# Patient Record
Sex: FEMALE | Race: WHITE | Employment: UNEMPLOYED | URBAN - METROPOLITAN AREA
[De-identification: names, ages, dates, MRNs, and addresses within clinical notes are randomized per-mention and may not be internally consistent; named-entity substitution may affect disease eponyms.]

---

## 2018-12-15 ENCOUNTER — APPOINTMENT (OUTPATIENT)
Dept: GENERAL RADIOLOGY | Age: 38
End: 2018-12-15
Payer: COMMERCIAL

## 2018-12-15 ENCOUNTER — HOSPITAL ENCOUNTER (EMERGENCY)
Age: 38
Discharge: HOME | End: 2018-12-15
Attending: EMERGENCY MEDICINE
Payer: COMMERCIAL

## 2018-12-15 VITALS
RESPIRATION RATE: 19 BRPM | BODY MASS INDEX: 29.03 KG/M2 | HEIGHT: 67 IN | WEIGHT: 185 LBS | HEART RATE: 83 BPM | OXYGEN SATURATION: 100 % | TEMPERATURE: 97.9 F | DIASTOLIC BLOOD PRESSURE: 45 MMHG | SYSTOLIC BLOOD PRESSURE: 128 MMHG

## 2018-12-15 DIAGNOSIS — R00.2 PALPITATIONS: Primary | ICD-10-CM

## 2018-12-15 LAB
A/G RATIO: 1.3 (ref 1.1–2.2)
ALBUMIN SERPL-MCNC: 4.4 G/DL (ref 3.4–5)
ALP BLD-CCNC: 86 U/L (ref 40–129)
ALT SERPL-CCNC: 10 U/L (ref 10–40)
ANION GAP SERPL CALCULATED.3IONS-SCNC: 10 MMOL/L (ref 3–16)
AST SERPL-CCNC: 13 U/L (ref 15–37)
BASOPHILS ABSOLUTE: 0.1 K/UL (ref 0–0.2)
BASOPHILS RELATIVE PERCENT: 1.9 %
BILIRUB SERPL-MCNC: 0.5 MG/DL (ref 0–1)
BUN BLDV-MCNC: 10 MG/DL (ref 7–20)
CALCIUM SERPL-MCNC: 9.4 MG/DL (ref 8.3–10.6)
CHLORIDE BLD-SCNC: 106 MMOL/L (ref 99–110)
CO2: 27 MMOL/L (ref 21–32)
CREAT SERPL-MCNC: 0.7 MG/DL (ref 0.6–1.1)
EOSINOPHILS ABSOLUTE: 0.1 K/UL (ref 0–0.6)
EOSINOPHILS RELATIVE PERCENT: 1.3 %
GFR AFRICAN AMERICAN: >60
GFR NON-AFRICAN AMERICAN: >60
GLOBULIN: 3.3 G/DL
GLUCOSE BLD-MCNC: 111 MG/DL (ref 70–99)
HCG QUALITATIVE: NEGATIVE
HCT VFR BLD CALC: 42 % (ref 36–48)
HEMOGLOBIN: 14.1 G/DL (ref 12–16)
LYMPHOCYTES ABSOLUTE: 0.6 K/UL (ref 1–5.1)
LYMPHOCYTES RELATIVE PERCENT: 11.6 %
MAGNESIUM: 2 MG/DL (ref 1.8–2.4)
MCH RBC QN AUTO: 27 PG (ref 26–34)
MCHC RBC AUTO-ENTMCNC: 33.5 G/DL (ref 31–36)
MCV RBC AUTO: 80.6 FL (ref 80–100)
MONOCYTES ABSOLUTE: 0.3 K/UL (ref 0–1.3)
MONOCYTES RELATIVE PERCENT: 5.2 %
NEUTROPHILS ABSOLUTE: 4.5 K/UL (ref 1.7–7.7)
NEUTROPHILS RELATIVE PERCENT: 80 %
PDW BLD-RTO: 13.8 % (ref 12.4–15.4)
PLATELET # BLD: 268 K/UL (ref 135–450)
PMV BLD AUTO: 8.3 FL (ref 5–10.5)
POTASSIUM SERPL-SCNC: 3.9 MMOL/L (ref 3.5–5.1)
RBC # BLD: 5.22 M/UL (ref 4–5.2)
SODIUM BLD-SCNC: 143 MMOL/L (ref 136–145)
TOTAL PROTEIN: 7.7 G/DL (ref 6.4–8.2)
TROPONIN: <0.01 NG/ML
TROPONIN: <0.01 NG/ML
WBC # BLD: 5.6 K/UL (ref 4–11)

## 2018-12-15 PROCEDURE — 93005 ELECTROCARDIOGRAM TRACING: CPT | Performed by: PHYSICIAN ASSISTANT

## 2018-12-15 PROCEDURE — 80053 COMPREHEN METABOLIC PANEL: CPT

## 2018-12-15 PROCEDURE — 84484 ASSAY OF TROPONIN QUANT: CPT

## 2018-12-15 PROCEDURE — 84443 ASSAY THYROID STIM HORMONE: CPT

## 2018-12-15 PROCEDURE — 83735 ASSAY OF MAGNESIUM: CPT

## 2018-12-15 PROCEDURE — 71046 X-RAY EXAM CHEST 2 VIEWS: CPT

## 2018-12-15 PROCEDURE — 2580000003 HC RX 258: Performed by: PHYSICIAN ASSISTANT

## 2018-12-15 PROCEDURE — 99285 EMERGENCY DEPT VISIT HI MDM: CPT

## 2018-12-15 PROCEDURE — 85025 COMPLETE CBC W/AUTO DIFF WBC: CPT

## 2018-12-15 PROCEDURE — 84703 CHORIONIC GONADOTROPIN ASSAY: CPT

## 2018-12-15 PROCEDURE — 93005 ELECTROCARDIOGRAM TRACING: CPT | Performed by: EMERGENCY MEDICINE

## 2018-12-15 RX ORDER — 0.9 % SODIUM CHLORIDE 0.9 %
1000 INTRAVENOUS SOLUTION INTRAVENOUS ONCE
Status: COMPLETED | OUTPATIENT
Start: 2018-12-15 | End: 2018-12-15

## 2018-12-15 RX ORDER — HYDROXYZINE PAMOATE 25 MG/1
25-50 CAPSULE ORAL 3 TIMES DAILY PRN
Qty: 30 CAPSULE | Refills: 0 | Status: SHIPPED | OUTPATIENT
Start: 2018-12-15 | End: 2018-12-29

## 2018-12-15 RX ADMIN — SODIUM CHLORIDE 1000 ML: 9 INJECTION, SOLUTION INTRAVENOUS at 13:06

## 2018-12-15 ASSESSMENT — PAIN SCALES - GENERAL: PAINLEVEL_OUTOF10: 1

## 2018-12-15 ASSESSMENT — PAIN DESCRIPTION - ORIENTATION: ORIENTATION: LEFT

## 2018-12-15 ASSESSMENT — PAIN DESCRIPTION - LOCATION: LOCATION: CHEST

## 2018-12-15 ASSESSMENT — PAIN DESCRIPTION - PAIN TYPE: TYPE: ACUTE PAIN

## 2018-12-15 NOTE — ED PROVIDER NOTES
[facility] Emergency Department      Pt Name: Edinson Ware  MRN: [id number]  [place] 1980  Date of evaluation: 12/15/2018  Provider: Eric Frye MD  I independently performed a history and physical on Aurora Reid. All diagnostic, treatment, and disposition decisions were made by myself in conjunction with the advanced practice provider. HPI: Aurora Reid presented with   Chief Complaint   Patient presents with    Palpitations     pt. reports palpitations this morning, has happened a couple times this week. Reports HR around 130s upon EMS arrival then  drove pt here. left sided chest pressure     Aurora Reid has a past medical history of Abnormal Pap smear and Abnormal Pap smear. She has a past surgical history that includes Mouth surgery. No current facility-administered medications on file prior to encounter. Current Outpatient Prescriptions on File Prior to Encounter   Medication Sig Dispense Refill    ibuprofen (ADVIL;MOTRIN) 800 MG tablet Take 1 tablet by mouth every 8 hours as needed. 120 tablet 3    docusate (COLACE, DULCOLAX) 100 MG CAPS Take 100 mg by mouth 2 times daily. 60 capsule 1    ibuprofen (IBU) 800 MG tablet Take 1 tablet by mouth every 8 hours as needed for Pain. 20 tablet 0    Prenatal Vit-Fe Fumarate-FA (PRENATAL MULTIVITAMIN) 27-1 MG TABS tablet Take 1 tablet by mouth daily. PHYSICAL EXAM  Vitals: /72   Pulse 77   Temp 97.9 °F (36.6 °C) (Oral)   Resp 23   Ht 5' 7\" (1.702 m)   Wt 185 lb (83.9 kg)   LMP 12/10/2018   SpO2 100%   BMI 28.98 kg/m²   Constitutional:  41 y.o. female nontoxic, alert  HENT:  Atraumatic, oral mucosa moist  Neck:  No visible JVD, supple  Chest/Lungs:  Respiratory effort normal, clear, regular  Abdomen:  Non-distended  Back:  No gross deformity  Extremities:  Normal tone and perfusion, no edema, no tenderness    Medical Decision Making and Plan: Briefly, this is an 41 y. o.female who presented with palpitations and chest discomfort, pulse of 130 briefly at home. She reports prior abnormal EKG for which she underwent other testing for in 2010 (I do not have a copy of it). She reports wearing a monitor and having an echocardiogram done. We did two sets of delayed cardiac markers, both of which are negative. The patient's history and evaluation suggests a less emergent etiology for the discomfort. We do not believe the patient is experiencing symptoms from acute coronary syndrome, aortic dissection, pulmonary embolism, pneumothorax, myocarditis, Boerhaave syndrome, pericardial tamponade, acute abdomen, amongst other emergencies. Aurora Reid was given appropriate discharge instructions. Referral given for follow up care. For further details of Aurora Reid's Emergency Department encounter, please see documentation by advanced practice provider ELVIA Torres.     Labs Reviewed   CBC WITH AUTO DIFFERENTIAL - Abnormal; Notable for the following:        Result Value    RBC 5.22 (*)     Lymphocytes # 0.6 (*)     All other components within normal limits    Narrative:     Performed at:  [facility] [address], [place] [employer]   Phone [phone]   COMPREHENSIVE METABOLIC PANEL - Abnormal; Notable for the following:     Glucose 111 (*)     AST 13 (*)     All other components within normal limits    Narrative:     Performed at:  [facility] [address], [place] [employer]   Phone [phone]   MAGNESIUM    Narrative:     Performed at:  [facility] [address], [place] [employer]   Phone [phone]   TROPONIN    Narrative:     Performed at:  [facility] [address], [place]   Phone [phone]   TROPONIN    Narrative:     Performed at:  [facility] [address], [place] [employer]   Phone [phone]   HCG, SERUM, QUALITATIVE    Narrative:     Performed at:  [facility] [address], [address]   Phone [phone]   TSH WITH REFLEX     RADIOLOGY:   Plain x-rays were viewed by me: Xr Chest Standard (2 Vw)    Result Date: 12/15/2018  No acute cardiopulmonary disease. EKG:  Read by me in the absence of a cardiologist shows:  Sinus rhythm, rate 79, no ectopy, intervals normal, axis normal, ST-T wave every modality in inferior leads to suggest ischemia, prior EKG not available    EKG #2 shows sinus rhythm, normal rate, normal axis, nonspecific ST-T wave abnormalities, no major change from first EKG    Medications administered:  Medications   0.9 % sodium chloride bolus (0 mLs Intravenous Stopped 12/15/18 7781)     FOLLOW UP:    [facility]) Pre-Services  [phone]  Schedule an appointment as soon as possible for a visit in 2 days      [facility] Emergency Department  [address] [facility]  [phone]    As needed, If symptoms worsen    FINAL IMPRESSION:    1.  Palpitations         Latricia MD Nurys  12/15/18 9039

## 2018-12-15 NOTE — ED NOTES
Bed: 23  Expected date:   Expected time:   Means of arrival:   Comments:  jean marie Cabral RN  12/15/18 7723

## 2018-12-16 LAB
EKG ATRIAL RATE: 79 BPM
EKG ATRIAL RATE: 96 BPM
EKG DIAGNOSIS: NORMAL
EKG DIAGNOSIS: NORMAL
EKG P AXIS: 58 DEGREES
EKG P AXIS: 65 DEGREES
EKG P-R INTERVAL: 136 MS
EKG P-R INTERVAL: 146 MS
EKG Q-T INTERVAL: 342 MS
EKG Q-T INTERVAL: 386 MS
EKG QRS DURATION: 76 MS
EKG QRS DURATION: 80 MS
EKG QTC CALCULATION (BAZETT): 432 MS
EKG QTC CALCULATION (BAZETT): 442 MS
EKG R AXIS: 16 DEGREES
EKG R AXIS: 26 DEGREES
EKG T AXIS: -10 DEGREES
EKG T AXIS: -9 DEGREES
EKG VENTRICULAR RATE: 79 BPM
EKG VENTRICULAR RATE: 96 BPM
TSH REFLEX: 1.11 UIU/ML (ref 0.27–4.2)

## 2018-12-16 PROCEDURE — 93010 ELECTROCARDIOGRAM REPORT: CPT | Performed by: INTERNAL MEDICINE

## 2018-12-16 ASSESSMENT — ENCOUNTER SYMPTOMS
VOMITING: 0
RHINORRHEA: 0
NAUSEA: 0
SHORTNESS OF BREATH: 0
ABDOMINAL PAIN: 0
COUGH: 0
DIARRHEA: 0

## 2019-01-07 ENCOUNTER — OFFICE VISIT (OUTPATIENT)
Dept: PRIMARY CARE CLINIC | Age: 39
End: 2019-01-07
Payer: COMMERCIAL

## 2019-01-07 VITALS
WEIGHT: 203.4 LBS | DIASTOLIC BLOOD PRESSURE: 83 MMHG | HEART RATE: 83 BPM | SYSTOLIC BLOOD PRESSURE: 128 MMHG | RESPIRATION RATE: 18 BRPM | BODY MASS INDEX: 31.86 KG/M2

## 2019-01-07 DIAGNOSIS — F41.9 ANXIETY: ICD-10-CM

## 2019-01-07 DIAGNOSIS — G89.29 CHRONIC PAIN OF RIGHT KNEE: Primary | ICD-10-CM

## 2019-01-07 DIAGNOSIS — M25.561 CHRONIC PAIN OF RIGHT KNEE: Primary | ICD-10-CM

## 2019-01-07 PROCEDURE — 99203 OFFICE O/P NEW LOW 30 MIN: CPT | Performed by: INTERNAL MEDICINE

## 2019-01-07 RX ORDER — HYDROXYZINE PAMOATE 25 MG/1
25 CAPSULE ORAL DAILY PRN
Qty: 30 CAPSULE | Refills: 0 | Status: SHIPPED | OUTPATIENT
Start: 2019-01-07 | End: 2019-02-04

## 2019-01-07 RX ORDER — HYDROXYZINE PAMOATE 25 MG/1
25 CAPSULE ORAL 3 TIMES DAILY PRN
COMMUNITY
End: 2019-01-07 | Stop reason: SDUPTHER

## 2019-01-07 ASSESSMENT — ENCOUNTER SYMPTOMS
SHORTNESS OF BREATH: 0
CONSTIPATION: 0
CHEST TIGHTNESS: 0
COUGH: 0
DIARRHEA: 0
NAUSEA: 0
SINUS PRESSURE: 0
ABDOMINAL PAIN: 0
VOMITING: 0

## 2019-01-07 ASSESSMENT — PATIENT HEALTH QUESTIONNAIRE - PHQ9
SUM OF ALL RESPONSES TO PHQ9 QUESTIONS 1 & 2: 0
1. LITTLE INTEREST OR PLEASURE IN DOING THINGS: 0
SUM OF ALL RESPONSES TO PHQ QUESTIONS 1-9: 0
2. FEELING DOWN, DEPRESSED OR HOPELESS: 0
SUM OF ALL RESPONSES TO PHQ QUESTIONS 1-9: 0

## 2019-01-15 ENCOUNTER — HOSPITAL ENCOUNTER (OUTPATIENT)
Dept: PHYSICAL THERAPY | Age: 39
Setting detail: THERAPIES SERIES
Discharge: HOME | End: 2019-01-15
Payer: COMMERCIAL

## 2019-01-15 PROCEDURE — 97110 THERAPEUTIC EXERCISES: CPT

## 2019-01-15 PROCEDURE — G8979 MOBILITY GOAL STATUS: HCPCS

## 2019-01-15 PROCEDURE — 97161 PT EVAL LOW COMPLEX 20 MIN: CPT

## 2019-01-15 PROCEDURE — 97140 MANUAL THERAPY 1/> REGIONS: CPT

## 2019-01-15 PROCEDURE — G8978 MOBILITY CURRENT STATUS: HCPCS

## 2019-01-25 ENCOUNTER — APPOINTMENT (OUTPATIENT)
Dept: PHYSICAL THERAPY | Age: 39
End: 2019-01-25
Payer: COMMERCIAL

## 2019-01-28 ENCOUNTER — TELEPHONE (OUTPATIENT)
Dept: FAMILY MEDICINE CLINIC | Age: 39
End: 2019-01-28

## 2019-01-28 DIAGNOSIS — R00.2 PALPITATIONS: Primary | ICD-10-CM

## 2019-01-29 ENCOUNTER — HOSPITAL ENCOUNTER (OUTPATIENT)
Dept: NEUROLOGY | Age: 39
Discharge: HOME | End: 2019-01-29
Payer: COMMERCIAL

## 2019-01-29 DIAGNOSIS — R00.2 PALPITATIONS: ICD-10-CM

## 2019-01-29 PROCEDURE — 93225 XTRNL ECG REC<48 HRS REC: CPT

## 2019-01-29 PROCEDURE — 93226 XTRNL ECG REC<48 HR SCAN A/R: CPT

## 2019-01-31 LAB
ACQUISITION DURATION: NORMAL S
AVERAGE HEART RATE: 80 BPM
EKG DIAGNOSIS: NORMAL
FASTEST SUPRAVENTRICULAR RATE: 163 BPM
HOLTER MAX HEART RATE: 155 BPM
HOOKUP DATE: NORMAL
HOOKUP TIME: NORMAL
LONGEST SUPRAVENTRICULAR RATE: 157 BPM
MAX HEART RATE TIME/DATE: NORMAL
MIN HEART RATE TIME/DATE: NORMAL
MIN HEART RATE: 57 BPM
NUMBER OF FASTEST SUPRAVENTRICULAR BEATS: 3
NUMBER OF LONGEST SUPRAVENTRICULAR BEATS: 7
NUMBER OF QRS COMPLEXES: NORMAL
NUMBER OF SUPRAVENTRICULAR BEATS IN RUNS: 10
NUMBER OF SUPRAVENTRICULAR COUPLETS: 0
NUMBER OF SUPRAVENTRICULAR ECTOPICS: 33
NUMBER OF SUPRAVENTRICULAR ISOLATED BEATS: 23
NUMBER OF SUPRAVENTRICULAR RUNS: 2
NUMBER OF VENTRICULAR BEATS IN RUNS: 0
NUMBER OF VENTRICULAR BIGEMINAL CYCLES: 0
NUMBER OF VENTRICULAR COUPLETS: 1
NUMBER OF VENTRICULAR ECTOPICS: 475
NUMBER OF VENTRICULAR ISOLATED BEATS: 473
NUMBER OF VENTRICULAR RUNS: 0

## 2019-02-04 ENCOUNTER — OFFICE VISIT (OUTPATIENT)
Dept: PRIMARY CARE CLINIC | Age: 39
End: 2019-02-04
Payer: COMMERCIAL

## 2019-02-04 VITALS
WEIGHT: 203.7 LBS | DIASTOLIC BLOOD PRESSURE: 83 MMHG | BODY MASS INDEX: 31.9 KG/M2 | RESPIRATION RATE: 20 BRPM | SYSTOLIC BLOOD PRESSURE: 135 MMHG | HEART RATE: 82 BPM

## 2019-02-04 DIAGNOSIS — F41.9 ANXIETY: Primary | ICD-10-CM

## 2019-02-04 PROCEDURE — 99212 OFFICE O/P EST SF 10 MIN: CPT | Performed by: INTERNAL MEDICINE

## 2019-02-04 RX ORDER — PROPRANOLOL HYDROCHLORIDE 20 MG/1
20 TABLET ORAL 3 TIMES DAILY PRN
Qty: 30 TABLET | Refills: 0 | Status: SHIPPED | OUTPATIENT
Start: 2019-02-04 | End: 2019-03-03 | Stop reason: SDUPTHER

## 2019-02-04 ASSESSMENT — ENCOUNTER SYMPTOMS
CHEST TIGHTNESS: 0
DIARRHEA: 0
COUGH: 0
NAUSEA: 0
VOMITING: 0
SHORTNESS OF BREATH: 0
ABDOMINAL PAIN: 0
SINUS PRESSURE: 0
CONSTIPATION: 0

## 2019-02-07 LAB
LEFT VENTRICULAR EJECTION FRACTION HIGH VALUE: 55 %
LEFT VENTRICULAR EJECTION FRACTION MODE: NORMAL

## 2019-02-13 ENCOUNTER — TELEPHONE (OUTPATIENT)
Dept: FAMILY MEDICINE CLINIC | Age: 39
End: 2019-02-13

## 2019-02-13 ENCOUNTER — HOSPITAL ENCOUNTER (OUTPATIENT)
Dept: NON INVASIVE DIAGNOSTICS | Age: 39
Discharge: HOME | End: 2019-02-13
Payer: COMMERCIAL

## 2019-02-13 DIAGNOSIS — R00.2 PALPITATIONS: ICD-10-CM

## 2019-02-13 LAB
LV EF: 55 %
LVEF MODALITY: NORMAL

## 2019-02-13 PROCEDURE — 93306 TTE W/DOPPLER COMPLETE: CPT

## 2019-02-13 RX ORDER — BUSPIRONE HYDROCHLORIDE 10 MG/1
10 TABLET ORAL 2 TIMES DAILY
Qty: 60 TABLET | Refills: 1 | Status: SHIPPED | OUTPATIENT
Start: 2019-02-13 | End: 2019-03-15

## 2019-03-06 RX ORDER — PROPRANOLOL HYDROCHLORIDE 20 MG/1
20 TABLET ORAL 3 TIMES DAILY PRN
Qty: 30 TABLET | Refills: 0 | Status: SHIPPED | OUTPATIENT
Start: 2019-03-06 | End: 2019-03-29 | Stop reason: SDUPTHER

## 2019-03-18 ENCOUNTER — OFFICE VISIT (OUTPATIENT)
Dept: PRIMARY CARE CLINIC | Age: 39
End: 2019-03-18
Payer: COMMERCIAL

## 2019-03-18 VITALS
WEIGHT: 202.1 LBS | DIASTOLIC BLOOD PRESSURE: 80 MMHG | BODY MASS INDEX: 31.65 KG/M2 | SYSTOLIC BLOOD PRESSURE: 118 MMHG | HEART RATE: 70 BPM

## 2019-03-18 DIAGNOSIS — F41.9 ANXIETY: Primary | ICD-10-CM

## 2019-03-18 DIAGNOSIS — G44.86 CERVICOGENIC HEADACHE: ICD-10-CM

## 2019-03-18 PROCEDURE — 99213 OFFICE O/P EST LOW 20 MIN: CPT | Performed by: INTERNAL MEDICINE

## 2019-03-18 ASSESSMENT — ENCOUNTER SYMPTOMS
COUGH: 0
SHORTNESS OF BREATH: 0

## 2019-03-21 ENCOUNTER — APPOINTMENT (OUTPATIENT)
Dept: CT IMAGING | Age: 39
End: 2019-03-21
Payer: COMMERCIAL

## 2019-03-21 ENCOUNTER — APPOINTMENT (OUTPATIENT)
Dept: GENERAL RADIOLOGY | Age: 39
End: 2019-03-21
Payer: COMMERCIAL

## 2019-03-21 ENCOUNTER — HOSPITAL ENCOUNTER (EMERGENCY)
Age: 39
Discharge: HOME | End: 2019-03-21
Attending: EMERGENCY MEDICINE
Payer: COMMERCIAL

## 2019-03-21 ENCOUNTER — PATIENT MESSAGE (OUTPATIENT)
Dept: PRIMARY CARE CLINIC | Age: 39
End: 2019-03-21

## 2019-03-21 VITALS
HEART RATE: 68 BPM | HEIGHT: 67 IN | SYSTOLIC BLOOD PRESSURE: 117 MMHG | TEMPERATURE: 97 F | DIASTOLIC BLOOD PRESSURE: 64 MMHG | OXYGEN SATURATION: 98 % | RESPIRATION RATE: 18 BRPM | BODY MASS INDEX: 29.82 KG/M2 | WEIGHT: 190 LBS

## 2019-03-21 DIAGNOSIS — R06.02 SHORTNESS OF BREATH: Primary | ICD-10-CM

## 2019-03-21 DIAGNOSIS — F41.1 ANXIETY STATE: ICD-10-CM

## 2019-03-21 LAB
A/G RATIO: 1.3 (ref 1.1–2.2)
ALBUMIN SERPL-MCNC: 4.4 G/DL (ref 3.4–5)
ALP BLD-CCNC: 89 U/L (ref 40–129)
ALT SERPL-CCNC: 13 U/L (ref 10–40)
ANION GAP SERPL CALCULATED.3IONS-SCNC: 10 MMOL/L (ref 3–16)
APTT: 33.3 SEC (ref 26–36)
AST SERPL-CCNC: 16 U/L (ref 15–37)
BASOPHILS ABSOLUTE: 0.1 K/UL (ref 0–0.2)
BASOPHILS RELATIVE PERCENT: 1.3 %
BILIRUB SERPL-MCNC: 0.4 MG/DL (ref 0–1)
BUN BLDV-MCNC: 9 MG/DL (ref 7–20)
CALCIUM SERPL-MCNC: 9.7 MG/DL (ref 8.3–10.6)
CHLORIDE BLD-SCNC: 100 MMOL/L (ref 99–110)
CO2: 27 MMOL/L (ref 21–32)
CREAT SERPL-MCNC: 0.7 MG/DL (ref 0.6–1.1)
D DIMER: 332 NG/ML DDU (ref 0–229)
EOSINOPHILS ABSOLUTE: 0.1 K/UL (ref 0–0.6)
EOSINOPHILS RELATIVE PERCENT: 0.7 %
GFR AFRICAN AMERICAN: >60
GFR NON-AFRICAN AMERICAN: >60
GLOBULIN: 3.4 G/DL
GLUCOSE BLD-MCNC: 108 MG/DL (ref 70–99)
HCG QUALITATIVE: NEGATIVE
HCT VFR BLD CALC: 44.4 % (ref 36–48)
HEMOGLOBIN: 14.6 G/DL (ref 12–16)
INR BLD: 1.13 (ref 0.86–1.14)
LYMPHOCYTES ABSOLUTE: 1.5 K/UL (ref 1–5.1)
LYMPHOCYTES RELATIVE PERCENT: 21.6 %
MCH RBC QN AUTO: 25.9 PG (ref 26–34)
MCHC RBC AUTO-ENTMCNC: 32.9 G/DL (ref 31–36)
MCV RBC AUTO: 78.7 FL (ref 80–100)
MONOCYTES ABSOLUTE: 0.4 K/UL (ref 0–1.3)
MONOCYTES RELATIVE PERCENT: 6.1 %
NEUTROPHILS ABSOLUTE: 4.9 K/UL (ref 1.7–7.7)
NEUTROPHILS RELATIVE PERCENT: 70.3 %
PDW BLD-RTO: 13.9 % (ref 12.4–15.4)
PLATELET # BLD: 341 K/UL (ref 135–450)
PMV BLD AUTO: 8.3 FL (ref 5–10.5)
POTASSIUM SERPL-SCNC: 3.5 MMOL/L (ref 3.5–5.1)
PRO-BNP: 115 PG/ML (ref 0–124)
PROTHROMBIN TIME: 12.9 SEC (ref 9.8–13)
RBC # BLD: 5.64 M/UL (ref 4–5.2)
SODIUM BLD-SCNC: 137 MMOL/L (ref 136–145)
TOTAL PROTEIN: 7.8 G/DL (ref 6.4–8.2)
TROPONIN: <0.01 NG/ML
WBC # BLD: 7 K/UL (ref 4–11)

## 2019-03-21 PROCEDURE — 85379 FIBRIN DEGRADATION QUANT: CPT

## 2019-03-21 PROCEDURE — 6360000004 HC RX CONTRAST MEDICATION: Performed by: EMERGENCY MEDICINE

## 2019-03-21 PROCEDURE — 80053 COMPREHEN METABOLIC PANEL: CPT

## 2019-03-21 PROCEDURE — 83880 ASSAY OF NATRIURETIC PEPTIDE: CPT

## 2019-03-21 PROCEDURE — 71260 CT THORAX DX C+: CPT

## 2019-03-21 PROCEDURE — 85025 COMPLETE CBC W/AUTO DIFF WBC: CPT

## 2019-03-21 PROCEDURE — 71046 X-RAY EXAM CHEST 2 VIEWS: CPT

## 2019-03-21 PROCEDURE — 85730 THROMBOPLASTIN TIME PARTIAL: CPT

## 2019-03-21 PROCEDURE — 84484 ASSAY OF TROPONIN QUANT: CPT

## 2019-03-21 PROCEDURE — 85610 PROTHROMBIN TIME: CPT

## 2019-03-21 PROCEDURE — 99285 EMERGENCY DEPT VISIT HI MDM: CPT

## 2019-03-21 PROCEDURE — 93005 ELECTROCARDIOGRAM TRACING: CPT | Performed by: PHYSICIAN ASSISTANT

## 2019-03-21 PROCEDURE — 84703 CHORIONIC GONADOTROPIN ASSAY: CPT

## 2019-03-21 RX ORDER — HYDROXYZINE PAMOATE 25 MG/1
25-50 CAPSULE ORAL 3 TIMES DAILY PRN
Qty: 20 CAPSULE | Refills: 0 | Status: SHIPPED | OUTPATIENT
Start: 2019-03-21 | End: 2019-04-22 | Stop reason: ALTCHOICE

## 2019-03-21 RX ORDER — BUSPIRONE HYDROCHLORIDE 10 MG/1
10 TABLET ORAL 2 TIMES DAILY
COMMUNITY
End: 2019-04-22 | Stop reason: ALTCHOICE

## 2019-03-21 RX ADMIN — IOPAMIDOL 75 ML: 755 INJECTION, SOLUTION INTRAVENOUS at 21:31

## 2019-03-21 ASSESSMENT — ENCOUNTER SYMPTOMS
COLOR CHANGE: 0
ABDOMINAL PAIN: 0
VOMITING: 0
BACK PAIN: 0
ABDOMINAL DISTENTION: 0
NAUSEA: 0
DIARRHEA: 0
COUGH: 0
SHORTNESS OF BREATH: 1
STRIDOR: 0
CONSTIPATION: 0
WHEEZING: 0
ALLERGIC/IMMUNOLOGIC NEGATIVE: 1

## 2019-03-22 LAB
EKG ATRIAL RATE: 80 BPM
EKG DIAGNOSIS: NORMAL
EKG P AXIS: 62 DEGREES
EKG P-R INTERVAL: 138 MS
EKG Q-T INTERVAL: 356 MS
EKG QRS DURATION: 78 MS
EKG QTC CALCULATION (BAZETT): 410 MS
EKG R AXIS: 27 DEGREES
EKG T AXIS: -15 DEGREES
EKG VENTRICULAR RATE: 80 BPM

## 2019-03-22 PROCEDURE — 93010 ELECTROCARDIOGRAM REPORT: CPT | Performed by: INTERNAL MEDICINE

## 2019-03-22 NOTE — ED NOTES
Pt a&o x4. Pt c/o SOB x2 days. Sts she started with a cough today, and daughter has had a cold for the last couple of days. Pt has hx of anxiety. IV established, blood work obtained and sent to lab. Pt tolerated well. Pt family at bedside. Pt placed on monitors and cycling. NSR with a HR of 75. ST segment alarm enabled. Pt sitting supine in bed in low position, call light in reach, side rails up x2. Pt showing no signs of distress at this time. Breathing easy and unlabored. Will continue to monitor.         Deacon Pitts RN  03/21/19 2100

## 2019-03-22 NOTE — ED PROVIDER NOTES
[facility] Emergency Department      Pt Name: Shoshana Clark  MRN: [id number]  [place] 1980  Date of evaluation: 3/21/2019  Provider: Raisa Johnson MD  I independently performed a history and physical on Aurora Reid. All diagnostic, treatment, and disposition decisions were made by myself in conjunction with the advanced practice provider. HPI: Shoshana Clark presented with   Chief Complaint   Patient presents with    Shortness of Breath     since approx 1900 yesterday patient has felt short of breath, felt like she was gasping to get a deep  breath, also states walking across the street to  daughter had pt winded, pt does have hx of anxiety. PCP wants to R/O blood clot     Aurora Reid has a past medical history of Abnormal Pap smear and Abnormal Pap smear. She has a past surgical history that includes Mouth surgery. No current facility-administered medications on file prior to encounter. Current Outpatient Medications on File Prior to Encounter   Medication Sig Dispense Refill    busPIRone (BUSPAR) 10 MG tablet Take 10 mg by mouth 2 times daily      propranolol (INDERAL) 20 MG tablet Take 1 tablet by mouth 3 times daily as needed (anxiety) 30 tablet 0     PHYSICAL EXAM  Vitals: /80   Pulse 72   Temp 97 °F (36.1 °C)   Resp 14   Ht 5' 7\" (1.702 m)   Wt 190 lb (86.2 kg)   LMP 03/09/2019   SpO2 98%   BMI 29.76 kg/m²   Constitutional:  41 y.o. female nontoxic, alert  HENT:  Atraumatic, oral mucosa moist  Neck:  No visible JVD, supple  Chest/Lungs:  Respiratory effort normal, clear, regular  Abdomen:  Non-distended, soft, NT  Back:  No gross deformity  Extremities:  Normal tone and perfusion, no edema      Medical Decision Making and Plan: Briefly, this is an 41 y. o.female who presented with sob, anxiety. After evaluation, the patient is comfortably breathing.   Based on clinical presentation and diagnostics we do not believe she is experiencing symptoms from acute coronary syndrome, Phone [phone]   HCG, SERUM, QUALITATIVE    Narrative:     Performed at:  [facility] [address], [facility], [address]   Phone [phone]     RADIOLOGY:   Plain x-rays were viewed by me: Xr Chest Standard (2 Vw)    Result Date: 3/21/2019  EXAMINATION: TWO VIEWS OF THE CHEST 3/21/2019 8:40 pm COMPARISON: 12/15/2018 HISTORY: ORDERING SYSTEM PROVIDED HISTORY: sob TECHNOLOGIST PROVIDED HISTORY: Reason for exam:->sob Ordering Physician Provided Reason for Exam: SOB Acuity: Acute Type of Exam: Initial FINDINGS: Heart size and configuration are normal.  The lungs are clear. No pneumothorax or pleural fluid. No acute bone finding. Normal chest examination. Ct Chest Pulmonary Embolism W Contrast    Result Date: 3/21/2019  EXAMINATION: CTA OF THE CHEST 3/21/2019 9:31 pm TECHNIQUE: CTA of the chest was performed after the administration of intravenous contrast.  Multiplanar reformatted images are provided for review. MIP images are provided for review. Dose modulation, iterative reconstruction, and/or weight based adjustment of the mA/kV was utilized to reduce the radiation dose to as low as reasonably achievable. COMPARISON: PA and lateral chest of 03/21/2019. HISTORY: ORDERING SYSTEM PROVIDED HISTORY: sob/positive d dimer TECHNOLOGIST PROVIDED HISTORY: Ordering Physician Provided Reason for Exam: shortness of breath Acuity: Unknown Type of Exam: Unknown Additional signs and symptoms: + d dimer Relevant Medical/Surgical History: (since approx 200 yesterday patient has felt short of breath, felt like she was gasping to get a deep breath, also states walking across the street to  daughter had pt winded, pt does have hx of anxiety. PCP wants to R/O blood clot) Acute symptoms. Initial exam. FINDINGS: Pulmonary Arteries: Pulmonary arteries are adequately opacified for evaluation. No evidence of intraluminal filling defect to suggest pulmonary embolism.   Main pulmonary artery is normal in caliber. Mediastinum: No evidence of mediastinal lymphadenopathy. The heart and pericardium demonstrate no acute abnormality. There is no acute abnormality of the thoracic aorta. Lungs/pleura: The lungs are without acute process. No focal consolidation or pulmonary edema. No evidence of pleural effusion or pneumothorax. Upper Abdomen: Limited images of the upper abdomen are unremarkable. Soft Tissues/Bones: No acute bone or soft tissue abnormality. No evidence of pulmonary embolism or acute pulmonary abnormality. EKG:  Read by me in the absence of a cardiologist shows:  Sinus rhythm, rate 80, normal conduction intervals, normal axis, no acute injury pattern, no prior EKG available for comparison     Discharge Medication List as of 3/21/2019  9:58 PM      START taking these medications    Details   hydrOXYzine (VISTARIL) 25 MG capsule Take 1-2 capsules by mouth 3 times daily as needed for Anxiety, Disp-20 capsule, R-0Print           FOLLOW UP:    Lucrecia Arevalo MD  [address] [place] [address]  [phone]      for follow up in 1-3 days    FINAL IMPRESSION:    1. Shortness of breath    2.  Anxiety state            Conrado Javier MD  03/22/19 1522

## 2019-03-22 NOTE — ED PROVIDER NOTES
[address] [facility]  eMERGENCY dEPARTMENT eNCOUnter        Pt Name: Rosario Barnhart  MRN: [id number]  [place] 1980  Date of evaluation: 3/21/2019  Provider: Gavino Sampson PA-C  PCP: Kinga Fam MD    This patient was seen and evaluated by the attending physician Dr. Sherin Bingham   Patient presents with    Shortness of Breath     since approx 1900 yesterday patient has felt short of breath, felt like she was gasping to get a deep  breath, also states walking across the street to  daughter had pt winded, pt does have hx of anxiety. PCP wants to R/O blood clot       HISTORY OF PRESENT ILLNESS   (Location/Symptom, Timing/Onset, Context/Setting, Quality, Duration, Modifying Factors, Severity)  Note limiting factors. Rosario Barnhart is a 41 y.o. female with history of anxiety who presents to the emergency department complaining of shortness of breath since yesterday at 7 PM.  The patient states that she has had shortness of breath and palpitations with prior panic attacks but nothing that has lasted this long. She denies any active palpitations at this time. The shortness of breath has subsided and/night however her doctor wanted her to come to the emergency department to rule out a blood clot. She denies chest pain, cough, congestion, hemoptysis, abdominal pain, nausea, vomiting, diarrhea, suicidal or homicidal ideation or hallucinations. She recently started psychiatric therapy 2 weeks ago. She took one dose of BuSpar this morning which did help with her shortness of breath. Nursing Notes were all reviewed and agreed with or any disagreements were addressed  in the HPI. REVIEW OF SYSTEMS    (2-9 systems for level 4, 10 or more for level 5)     Review of Systems   Constitutional: Negative for chills and fever. HENT: Negative. Eyes: Negative for visual disturbance.    Respiratory: Positive for shortness of breath. Negative for cough, wheezing and stridor. Cardiovascular: Negative for chest pain, palpitations and leg swelling. Gastrointestinal: Negative for abdominal distention, abdominal pain, constipation, diarrhea, nausea and vomiting. Endocrine: Negative. Genitourinary: Negative. Musculoskeletal: Negative for back pain, neck pain and neck stiffness. Skin: Negative for color change, pallor, rash and wound. Allergic/Immunologic: Negative. Neurological: Negative for dizziness, tremors, seizures, syncope, facial asymmetry, speech difficulty, weakness, light-headedness, numbness and headaches. Hematological: Negative. Psychiatric/Behavioral: Positive for sleep disturbance. Negative for confusion, hallucinations and suicidal ideas. The patient is nervous/anxious. All other systems reviewed and are negative. Positives and Pertinent negatives as per HPI. Except as noted abovein the ROS, all other systems were reviewed and negative.        PAST MEDICAL HISTORY     Past Medical History:   Diagnosis Date    Abnormal Pap smear     HPV 2010    Abnormal Pap smear          SURGICAL HISTORY     Past Surgical History:   Procedure Laterality Date    MOUTH SURGERY           CURRENTMEDICATIONS       Previous Medications    BUSPIRONE (BUSPAR) 10 MG TABLET    Take 10 mg by mouth 2 times daily    PROPRANOLOL (INDERAL) 20 MG TABLET    Take 1 tablet by mouth 3 times daily as needed (anxiety)         ALLERGIES     Pcn [penicillins]    FAMILYHISTORY       Family History   Problem Relation Age of Onset    Diabetes Maternal Grandmother     Heart Disease Maternal Grandfather     Heart Disease Paternal Grandfather           SOCIAL HISTORY       Social History     Socioeconomic History    Marital status:      Spouse name: None    Number of children: None    Years of education: None    Highest education level: None   Occupational History    None   Social Needs    Financial resource strain: None  Food insecurity:     Worry: None     Inability: None    Transportation needs:     Medical: None     Non-medical: None   Tobacco Use    Smoking status: Former Smoker    Smokeless tobacco: Former User     Quit date: 2/18/2009   Substance and Sexual Activity    Alcohol use: No    Drug use: No    Sexual activity: Yes     Partners: Male   Lifestyle    Physical activity:     Days per week: None     Minutes per session: None    Stress: None   Relationships    Social connections:     Talks on phone: None     Gets together: None     Attends Temple service: None     Active member of club or organization: None     Attends meetings of clubs or organizations: None     Relationship status: None    Intimate partner violence:     Fear of current or ex partner: None     Emotionally abused: None     Physically abused: None     Forced sexual activity: None   Other Topics Concern    None   Social History Narrative    None       SCREENINGS             PHYSICAL EXAM    (up to 7 for level 4, 8 or more for level 5)     ED Triage Vitals [03/21/19 1959]   BP Temp Temp src Pulse Resp SpO2 Height Weight   118/80 97 °F (36.1 °C) -- 84 16 100 % 5' 7\" (1.702 m) 190 lb (86.2 kg)       Physical Exam   Constitutional: She is oriented to person, place, and time. She appears well-developed and well-nourished. No distress. HENT:   Head: Normocephalic and atraumatic. Right Ear: External ear normal.   Left Ear: External ear normal.   Nose: Nose normal.   Mouth/Throat: Oropharynx is clear and moist.   Eyes: Pupils are equal, round, and reactive to light. Conjunctivae and EOM are normal. Right eye exhibits no discharge. Left eye exhibits no discharge. Neck: Normal range of motion. No JVD present. Cardiovascular: Normal rate, regular rhythm and normal heart sounds. Exam reveals no gallop and no friction rub. No murmur heard. Pulmonary/Chest: Effort normal and breath sounds normal. No respiratory distress. She has no wheezes. She has no rales. Abdominal: Soft. Bowel sounds are normal. She exhibits no distension. There is no tenderness. Musculoskeletal: Normal range of motion. No extremity edema, posterior calf or thigh tenderness, palpable cord, discoloration. Negative homans. Lymphadenopathy:     She has no cervical adenopathy. Neurological: She is alert and oriented to person, place, and time. Skin: Skin is warm and dry. Capillary refill takes less than 2 seconds. No rash noted. She is not diaphoretic. No erythema. No pallor. Psychiatric: Her speech is normal and behavior is normal. Judgment and thought content normal. Her mood appears anxious. She is not actively hallucinating. Cognition and memory are normal. She is attentive. Nursing note and vitals reviewed.       DIAGNOSTIC RESULTS   LABS:    Labs Reviewed   CBC WITH AUTO DIFFERENTIAL - Abnormal; Notable for the following components:       Result Value    RBC 5.64 (*)     MCV 78.7 (*)     MCH 25.9 (*)     All other components within normal limits    Narrative:     Performed at:  [facility] [address] [place]   Phone [phone]   COMPREHENSIVE METABOLIC PANEL - Abnormal; Notable for the following components:    Glucose 108 (*)     All other components within normal limits    Narrative:     Performed at:  [facility] [address] [place]   Phone [phone]   D-DIMER, QUANTITATIVE - Abnormal; Notable for the following components:    D-Dimer, Quant 332 (*)     All other components within normal limits    Narrative:     Performed at:  [facility] [address],  [place], [place]   Phone [phone]   TROPONIN    Narrative:     Performed at:  [facility] [address] [place]   Phone [phone]   APTT    Narrative:     Performed at:  [facility]  [address] [facility]  [place], [address]   Phone [phone]   PROTIME-INR    Narrative:     Performed at:  [facility]  [address] [facility]  [place], [address]   Phone [phone]   BRAIN NATRIURETIC PEPTIDE    Narrative:     Performed at:  [facility]  [address] [facility],  [place], [address]   Phone [phone]   HCG, SERUM, QUALITATIVE    Narrative:     Performed at:  [facility]  [address] [facility],  [place], [address]   Phone [phone]       All other labs were within normal range or not returned as of this dictation. EKG: All EKG's are interpreted by the Emergency Department Physician who either signs orCo-signs this chart in the absence of a cardiologist.  Please see their note for interpretation of EKG. RADIOLOGY:   Non-plain film images such as CT, Ultrasound and MRI are read by the radiologist. Kamron Endow radiographic images are visualized andpreliminarily interpreted by the  ED Provider with the below findings:        Interpretation [facility] Radiologist below, if available at the time of this note:    CT Chest Pulmonary Embolism W Contrast   Final Result   No evidence of pulmonary embolism or acute pulmonary abnormality. XR CHEST STANDARD (2 VW)   Final Result   Normal chest examination. PROCEDURES   Unless otherwise noted below, none     Procedures    CRITICAL CARE TIME   N/A    CONSULTS:  None      EMERGENCY DEPARTMENT COURSE and DIFFERENTIALDIAGNOSIS/MDM:   Vitals:    Vitals:    03/21/19 2010 03/21/19 2045 03/21/19 2100 03/21/19 2148   BP:  115/81 122/80 117/64   Pulse: 82 79 72 68   Resp: 18 20 14 18   Temp:       SpO2: 96% 98%     Weight:       Height:           Patient was given thefollowing medications:  Medications   iopamidol (ISOVUE-370) 76 % injection 75 mL (75 mLs Intravenous Given 3/21/19 2131)     This patient presents complaining of shortness of breath.

## 2019-03-22 NOTE — ED NOTES
Pt d/c instructions given, v/u. IV removed without complications. A&O with no signs of distress. Denies needs at this time. Pt ambulated to exit.         Lisbet Cordova RN  03/21/19 0744

## 2019-03-30 RX ORDER — PROPRANOLOL HYDROCHLORIDE 20 MG/1
20 TABLET ORAL 3 TIMES DAILY PRN
Qty: 30 TABLET | Refills: 0 | Status: SHIPPED | OUTPATIENT
Start: 2019-03-30 | End: 2019-04-15 | Stop reason: SDUPTHER

## 2019-04-15 RX ORDER — PROPRANOLOL HYDROCHLORIDE 20 MG/1
20 TABLET ORAL 3 TIMES DAILY PRN
Qty: 30 TABLET | Refills: 0 | Status: SHIPPED | OUTPATIENT
Start: 2019-04-15 | End: 2019-05-01 | Stop reason: SDUPTHER

## 2019-04-22 ENCOUNTER — OFFICE VISIT (OUTPATIENT)
Dept: PRIMARY CARE CLINIC | Age: 39
End: 2019-04-22
Payer: COMMERCIAL

## 2019-04-22 VITALS
BODY MASS INDEX: 30.06 KG/M2 | SYSTOLIC BLOOD PRESSURE: 110 MMHG | HEART RATE: 68 BPM | WEIGHT: 191.9 LBS | OXYGEN SATURATION: 96 % | DIASTOLIC BLOOD PRESSURE: 69 MMHG

## 2019-04-22 DIAGNOSIS — F41.9 ANXIETY: Primary | ICD-10-CM

## 2019-04-22 PROCEDURE — 99212 OFFICE O/P EST SF 10 MIN: CPT | Performed by: INTERNAL MEDICINE

## 2019-04-22 NOTE — PROGRESS NOTES
Aurora Reid  : 1980  Encounter date: 2019    This brent 41 y.o. female who presents with  Chief Complaint   Patient presents with    1 Month Follow-Up       History of present illness:    HPI Patient presents today for a 1 month anxiety and medication check. No concerns. Feels like the Sertraline 50 mg feels like it is working. She has some bad days, but overall mood is better, fewer panic attacks, less anxious. She continues to use the propranolol PRN for panic attacks. Had some increase in suicidal thoughts but no plans when she first started the medication, this has resolved for the past 2 weeks. Allergies   Allergen Reactions    Pcn [Penicillins] Swelling     Current Outpatient Medications   Medication Sig Dispense Refill    sertraline (ZOLOFT) 50 MG tablet   1    propranolol (INDERAL) 20 MG tablet Take 1 tablet by mouth 3 times daily as needed (anxiety) 30 tablet 0     No current facility-administered medications for this visit. Review of Systems   Constitutional: Negative for activity change, appetite change, chills, fatigue and fever. Respiratory: Negative for cough and shortness of breath. Cardiovascular: Negative for chest pain and palpitations. Musculoskeletal: Negative for arthralgias. Skin: Negative for rash. Neurological: Negative for headaches. Past medical, surgical, family and social history were reviewed and updated with the patient. Objective:    /69 (Site: Right Upper Arm, Position: Sitting, Cuff Size: Medium Adult)   Pulse 68   Wt 191 lb 14.4 oz (87 kg)   LMP 2019   SpO2 96%   BMI 30.06 kg/m²   Weight: 191 lb 14.4 oz (87 kg)     BP Readings from Last 3 Encounters:   19 110/69   19 117/64   19 118/80     Wt Readings from Last 3 Encounters:   19 191 lb 14.4 oz (87 kg)   19 190 lb (86.2 kg)   19 202 lb 1.6 oz (91.7 kg)       Physical Exam   Constitutional: She is oriented to person, place, and time.  She appears well-developed and well-nourished. No distress. HENT:   Head: Normocephalic and atraumatic. Eyes: Pupils are equal, round, and reactive to light. Conjunctivae and EOM are normal.   Cardiovascular: Normal rate, regular rhythm, normal heart sounds and intact distal pulses. Exam reveals no gallop and no friction rub. No murmur heard. Pulmonary/Chest: Effort normal and breath sounds normal. No respiratory distress. She has no wheezes. She has no rales. She exhibits no tenderness. Neurological: She is alert and oriented to person, place, and time. Skin: Skin is warm and dry. She is not diaphoretic. Psychiatric: She has a normal mood and affect. Her behavior is normal. Judgment and thought content normal.   Vitals reviewed. Assessment/Plan    1. Anxiety  - Improved with Sertraline - cont to f/u with psych and therapist. Contracts for safety, denies SI/HI at this time. Return precautions reviewed. Cont PRN Propranolol          No follow-ups on file.

## 2019-04-23 ASSESSMENT — ENCOUNTER SYMPTOMS
COUGH: 0
SHORTNESS OF BREATH: 0

## 2019-05-02 RX ORDER — PROPRANOLOL HYDROCHLORIDE 20 MG/1
20 TABLET ORAL 3 TIMES DAILY PRN
Qty: 30 TABLET | Refills: 3 | Status: SHIPPED | OUTPATIENT
Start: 2019-05-02 | End: 2019-05-29 | Stop reason: SDUPTHER

## 2019-05-29 RX ORDER — PROPRANOLOL HYDROCHLORIDE 20 MG/1
20 TABLET ORAL 3 TIMES DAILY PRN
Qty: 30 TABLET | Refills: 3 | Status: SHIPPED | OUTPATIENT
Start: 2019-05-29 | End: 2019-07-29 | Stop reason: SDUPTHER

## 2019-07-29 ENCOUNTER — OFFICE VISIT (OUTPATIENT)
Dept: PRIMARY CARE CLINIC | Age: 39
End: 2019-07-29
Payer: COMMERCIAL

## 2019-07-29 VITALS
WEIGHT: 203.7 LBS | SYSTOLIC BLOOD PRESSURE: 112 MMHG | BODY MASS INDEX: 31.9 KG/M2 | HEART RATE: 86 BPM | OXYGEN SATURATION: 100 % | DIASTOLIC BLOOD PRESSURE: 74 MMHG

## 2019-07-29 DIAGNOSIS — F41.9 ANXIETY: Primary | ICD-10-CM

## 2019-07-29 PROCEDURE — 99212 OFFICE O/P EST SF 10 MIN: CPT | Performed by: INTERNAL MEDICINE

## 2019-07-29 RX ORDER — PROPRANOLOL HYDROCHLORIDE 20 MG/1
20 TABLET ORAL 3 TIMES DAILY PRN
Qty: 90 TABLET | Refills: 3 | Status: SHIPPED | OUTPATIENT
Start: 2019-07-29 | End: 2020-02-27 | Stop reason: SDUPTHER

## 2019-07-29 RX ORDER — VENLAFAXINE HYDROCHLORIDE 75 MG/1
150 CAPSULE, EXTENDED RELEASE ORAL
Refills: 1 | COMMUNITY
Start: 2019-07-07

## 2019-07-29 ASSESSMENT — ENCOUNTER SYMPTOMS
CONSTIPATION: 0
SHORTNESS OF BREATH: 0
NAUSEA: 0
ABDOMINAL PAIN: 0
CHEST TIGHTNESS: 0
DIARRHEA: 0
VOMITING: 0
SINUS PRESSURE: 0
COUGH: 0

## 2020-02-14 ENCOUNTER — OFFICE VISIT (OUTPATIENT)
Dept: PRIMARY CARE CLINIC | Age: 40
End: 2020-02-14

## 2020-02-14 VITALS
DIASTOLIC BLOOD PRESSURE: 78 MMHG | SYSTOLIC BLOOD PRESSURE: 126 MMHG | HEART RATE: 81 BPM | BODY MASS INDEX: 38.39 KG/M2 | OXYGEN SATURATION: 97 % | WEIGHT: 245.1 LBS

## 2020-02-14 PROCEDURE — 99213 OFFICE O/P EST LOW 20 MIN: CPT | Performed by: NURSE PRACTITIONER

## 2020-02-14 ASSESSMENT — ENCOUNTER SYMPTOMS
NAUSEA: 0
DIARRHEA: 0
SHORTNESS OF BREATH: 0
VOMITING: 0
COUGH: 0

## 2020-02-14 ASSESSMENT — PATIENT HEALTH QUESTIONNAIRE - PHQ9
SUM OF ALL RESPONSES TO PHQ9 QUESTIONS 1 & 2: 0
2. FEELING DOWN, DEPRESSED OR HOPELESS: 0
1. LITTLE INTEREST OR PLEASURE IN DOING THINGS: 0
SUM OF ALL RESPONSES TO PHQ QUESTIONS 1-9: 0
SUM OF ALL RESPONSES TO PHQ QUESTIONS 1-9: 0

## 2020-02-14 NOTE — PROGRESS NOTES
Aurora Reid  : 1980  Encounter date: 2020    This brent 46 y.o. female who presents with  Chief Complaint   Patient presents with    New Patient     Pt presents today as a former [facility] patient, 6 month follow up on anxiety. History of present illness:    HPI   1. Presents to clinic today to establish care with me - previous Dr. Sara Sanders patient. Has been followed for her anxiety. Doing extremely well with current dosage of Effexor along with PRN propranolol. Typically takes the propranolol once daily in the morning and will suffice for the whole day. Does follow with counseling which she finds to be very helpful. Only compliant currently is weight gain - 50lbs in the past year since starting on medications. Has been taking measures to incorporate regular exercise and eating healthier. Allergies   Allergen Reactions    Pcn [Penicillins] Swelling     Current Outpatient Medications   Medication Sig Dispense Refill    venlafaxine (EFFEXOR XR) 75 MG extended release capsule 150 mg   1    propranolol (INDERAL) 20 MG tablet Take 1 tablet by mouth 3 times daily as needed (anxiety) 90 tablet 3     No current facility-administered medications for this visit. Review of Systems   Constitutional: Negative for activity change, appetite change, chills, fatigue and fever. Respiratory: Negative for cough and shortness of breath. Cardiovascular: Negative for chest pain and palpitations. Gastrointestinal: Negative for diarrhea, nausea and vomiting. Past medical, surgical, family and social history were reviewed and updated with the patient.     Objective:    /78 (Site: Left Upper Arm, Position: Sitting, Cuff Size: Large Adult)   Pulse 81   Wt 245 lb 1.6 oz (111.2 kg)   LMP 2020   SpO2 97%   BMI 38.39 kg/m²   Weight: 245 lb 1.6 oz (111.2 kg)     BP Readings from Last 3 Encounters:   20 126/78   19 112/74   19 110/69     Wt Readings from Last 3 Encounters: 02/14/20 245 lb 1.6 oz (111.2 kg)   07/29/19 203 lb 11.2 oz (92.4 kg)   04/22/19 191 lb 14.4 oz (87 kg)       Physical Exam  Constitutional:       General: She is not in acute distress. Appearance: She is well-developed. HENT:      Head: Normocephalic and atraumatic. Cardiovascular:      Rate and Rhythm: Normal rate and regular rhythm. Heart sounds: Normal heart sounds, S1 normal and S2 normal.   Pulmonary:      Effort: Pulmonary effort is normal. No respiratory distress. Breath sounds: Normal breath sounds. Skin:     General: Skin is warm and dry. Neurological:      Mental Status: She is alert and oriented to person, place, and time. Psychiatric:         Mood and Affect: Mood normal.         Behavior: Behavior normal. Behavior is cooperative. Thought Content: Thought content normal.         Judgment: Judgment normal.       Assessment/Plan    1. Anxiety  Stable. Continue on current medication regimen. Will refill when needed. Return to office in 1 year for continued monitoring. 2. Healthcare maintenance  Complete routine blood work. Will call with results and make treatment plan if needed. - CBC Auto Differential; Future  - Comprehensive Metabolic Panel; Future  - Lipid Panel; Future       She verbalized understanding of instructions and counseling. Return in about 1 year (around 2/14/2021).

## 2020-02-27 RX ORDER — PROPRANOLOL HYDROCHLORIDE 20 MG/1
20 TABLET ORAL 3 TIMES DAILY PRN
Qty: 90 TABLET | Refills: 3 | Status: SHIPPED | OUTPATIENT
Start: 2020-02-27 | End: 2021-01-22 | Stop reason: SDUPTHER

## 2021-01-22 RX ORDER — PROPRANOLOL HYDROCHLORIDE 20 MG/1
20 TABLET ORAL 3 TIMES DAILY PRN
Qty: 90 TABLET | Refills: 0 | Status: SHIPPED | OUTPATIENT
Start: 2021-01-22 | End: 2021-07-06 | Stop reason: SDUPTHER

## 2021-01-22 NOTE — TELEPHONE ENCOUNTER
Medication:   Requested Prescriptions     Pending Prescriptions Disp Refills    propranolol (INDERAL) 20 MG tablet 90 tablet 3     Sig: Take 1 tablet by mouth 3 times daily as needed (anxiety)      Last Filled:      Patient Phone Number: [phone] (home)     Last appt: 2/14/2020  Next appt: Visit date not found    Last OARRS: No flowsheet data found.   PDMP Monitoring:    Last PDMP [place] as Reviewed [facility]):  Review User Review Instant Review Result          Preferred Pharmacy:   [facility] [address] [place] [address] [phone] Babita Haro [phone]  [id number]  [place] [id number]  Phone: [phone] Fax: [phone]

## 2021-05-26 RX ORDER — PROPRANOLOL HYDROCHLORIDE 20 MG/1
20 TABLET ORAL 3 TIMES DAILY PRN
Qty: 90 TABLET | Refills: 0 | OUTPATIENT
Start: 2021-05-26

## 2021-07-08 RX ORDER — PROPRANOLOL HYDROCHLORIDE 20 MG/1
20 TABLET ORAL 3 TIMES DAILY PRN
Qty: 90 TABLET | Refills: 0 | Status: SHIPPED | OUTPATIENT
Start: 2021-07-08 | End: 2021-08-04

## 2021-08-04 ENCOUNTER — OFFICE VISIT (OUTPATIENT)
Dept: FAMILY MEDICINE CLINIC | Age: 41
End: 2021-08-04

## 2021-08-04 VITALS
WEIGHT: 292.8 LBS | DIASTOLIC BLOOD PRESSURE: 82 MMHG | HEIGHT: 68 IN | SYSTOLIC BLOOD PRESSURE: 122 MMHG | BODY MASS INDEX: 44.38 KG/M2 | TEMPERATURE: 96.9 F | OXYGEN SATURATION: 98 % | HEART RATE: 76 BPM

## 2021-08-04 DIAGNOSIS — Z13.220 SCREENING FOR HYPERLIPIDEMIA: ICD-10-CM

## 2021-08-04 DIAGNOSIS — Z13.1 SCREENING FOR DIABETES MELLITUS: ICD-10-CM

## 2021-08-04 DIAGNOSIS — Z00.00 ANNUAL PHYSICAL EXAM: Primary | ICD-10-CM

## 2021-08-04 DIAGNOSIS — E66.01 CLASS 3 SEVERE OBESITY DUE TO EXCESS CALORIES WITHOUT SERIOUS COMORBIDITY WITH BODY MASS INDEX (BMI) OF 45.0 TO 49.9 IN ADULT (HCC): ICD-10-CM

## 2021-08-04 DIAGNOSIS — F41.9 ANXIETY: ICD-10-CM

## 2021-08-04 DIAGNOSIS — Z12.31 ENCOUNTER FOR SCREENING MAMMOGRAM FOR MALIGNANT NEOPLASM OF BREAST: ICD-10-CM

## 2021-08-04 PROCEDURE — 99396 PREV VISIT EST AGE 40-64: CPT | Performed by: NURSE PRACTITIONER

## 2021-08-04 RX ORDER — PROPRANOLOL HYDROCHLORIDE 20 MG/1
TABLET ORAL
Qty: 90 TABLET | Refills: 0 | Status: SHIPPED | OUTPATIENT
Start: 2021-08-04 | End: 2022-01-03

## 2021-08-04 ASSESSMENT — PATIENT HEALTH QUESTIONNAIRE - PHQ9
1. LITTLE INTEREST OR PLEASURE IN DOING THINGS: 0
2. FEELING DOWN, DEPRESSED OR HOPELESS: 0
SUM OF ALL RESPONSES TO PHQ QUESTIONS 1-9: 0
SUM OF ALL RESPONSES TO PHQ9 QUESTIONS 1 & 2: 0

## 2021-08-04 NOTE — PATIENT INSTRUCTIONS
directive that allows you to name a person to make health care decisions for you if and when you become unable to make them for yourself. The person you name to make decisions on your behalf is some times called your health care surrogate, agent, proxy or -in-fact. The person who holds your medical POA can respond to medical situations you might not have anticipated and make decisions for you empowered by knowledge of your values and wishes. The medical POA form can be obtained from attorneys, [facility], and healthcare facilities. This signed form must be notarized or witnessed. The medical POA document is different from the power of  form that authorizes someone to make financial transactions for you. What is cardiopulmonary resuscitation (CPR)? CPR is a technique useful in many emergencies, including heart attack or near drowning, in which someone's breathing or heartbeat has stopped. CPR may include chest compression, mouth-to-mouth or other rescue breathing and/or electric shock. What is a DNR -Comfort Care form (a.k.a. [facility])? DNR means do not resuscitate. A DNR is a medical order given by a physician or other legally authorized prescriber. It addresses the various methods used to revive people whose hearts have stopped beating /or who have stopped breathing. If a person has a [facility] order, he will receive care that eases pain and suffering but no cardio-pulmonary resuscitation (CPR) to save or prolong life. The [facility] becomes active as soon as it is signed by the doctor or advanced practice nurse. The [facility] is a standard form which can be obtained from the [address] [facility] or healthcare facilities. What is DNR Comfort Care - Arrest (a.k.a. [address])? The [address] is similar to the [facility] but it only becomes active if and when the person has a cardiac and/or respiratory arrest (i.e. the person stops breathing or his heart stops beating).  The [facility] - Arrest is a standard form which can be obtained from the [address] or healthcare facilities. What is a MOLST? MOLST stands for Medical Orders for Life Sustaining Treatment. Bobbi Solisring is a medical order which specifies different treatment options for individuals who are seriously ill or frail and elderly. The MOLST allows patients or their surrogate to discuss care options they do and do not want to receive at the end of life, and then have their physician or other prescriber convey them into orders. This form would be available through [address] and healthcare facilities. Patient Education        Anxiety Disorder: Care Instructions  Your Care Instructions     Anxiety is a normal reaction to stress. Difficult situations can cause you to have symptoms such as sweaty palms and a nervous feeling. In an anxiety disorder, the symptoms are far more severe. Constant worry, muscle tension, trouble sleeping, nausea and diarrhea, and other symptoms can make normal daily activities difficult or impossible. These symptoms may occur for no reason, and they can affect your work, school, or social life. Medicines, counseling, and self-care can all help. Follow-up care is a key part of your treatment and safety. Be sure to make and go to all appointments, and call your doctor if you are having problems. It's also a good idea to know your test results and keep a list of the medicines you take. How can you care for yourself at home? · Take medicines exactly as directed. Call your doctor if you think you are having a problem with your medicine. · Go to your counseling sessions and follow-up appointments. · Recognize and accept your anxiety. Then, when you are in a situation that makes you anxious, say to yourself, \"This is not an emergency. I feel uncomfortable, but I am not in danger. I can keep going even if I feel anxious. \"  · Be kind to your body:  ? Relieve tension with exercise or a massage. ? Get enough rest.  ? Avoid alcohol, caffeine, nicotine, and illegal drugs. They can increase your anxiety level and cause sleep problems. ? Learn and do relaxation techniques. See below for more about these techniques. · Engage your mind. Get out and do something you enjoy. Go to a funny movie, or take a walk or hike. Plan your day. Having too much or too little to do can make you anxious. · Keep a record of your symptoms. Discuss your fears with a good friend or family member, or join a support group for people with similar problems. Talking to others sometimes relieves stress. · Get involved in social groups, or volunteer to help others. Being alone sometimes makes things seem worse than they are. · Get at least 30 minutes of exercise on most days of the week to relieve stress. Walking is a good choice. You also may want to do other activities, such as running, swimming, cycling, or playing tennis or team sports. Relaxation techniques  Do relaxation exercises 10 to 20 minutes a day. You can play soothing, relaxing music while you do them, if you wish. · Tell others in your house that you are going to do your relaxation exercises. Ask them not to disturb you. · Find a comfortable place, away from all distractions and noise. · Lie down on your back, or sit with your back straight. · Focus on your breathing. Make it slow and steady. · Breathe in through your nose. Breathe out through either your nose or mouth. · Breathe deeply, filling up the area between your navel and your rib cage. Breathe so that your belly goes up and down. · Do not hold your breath. · Breathe like this for 5 to 10 minutes. Notice the feeling of calmness throughout your whole body. As you continue to breathe slowly and deeply, relax by doing the following for another 5 to 10 minutes:  · Tighten and relax each muscle group in your body.  You can begin at your toes and work your way up to your head.  · Imagine your muscle groups relaxing and becoming heavy. · Empty your mind of all thoughts. · Let yourself relax more and more deeply. · Become aware of the state of calmness that surrounds you. · When your relaxation time is over, you can bring yourself back to alertness by moving your fingers and toes and then your hands and feet and then stretching and moving your entire body. Sometimes people fall asleep during relaxation, but they usually wake up shortly afterward. · Always give yourself time to return to full alertness before you drive a car or do anything that might cause an accident if you are not fully alert. Never play a relaxation tape while you drive a car. When should you call for help? Call 911 anytime you think you may need emergency care. For example, call if:    · You feel you cannot stop from hurting yourself or someone else. Keep the numbers for these national suicide hotlines: 4-807-561-TALK (3-523.322.3603) and 0-781-EVMGEWD (8-951.529.2664). If you or someone you know talks about suicide or feeling hopeless, get help right away. Watch closely for changes in your health, and be sure to contact your doctor if:    · You have anxiety or fear that affects your life.     · You have symptoms of anxiety that are new or different from those you had before. Where can you learn more? Go to [email or web address]://[email or web address] Live Chatpeagataeb.[email or web address]. org and sign in to your CashSentinel account. Enter P754 in the [facility] box to learn more about \"Anxiety Disorder: Care Instructions. \"     If you do not have an account, please click on the \"Sign Up Now\" link. Current as of: September 23, 2020               Content Version: 12.9  © 2573-9686 Healthwise, [employer]. Care instructions adapted under license by [facility] ([facility]).  If you have questions about a medical condition or this instruction, always ask your healthcare professional. Francesca Oh any warranty or liability for your use of this information. Patient Education        Well Visit, Ages 25 to 48: Care Instructions  Overview     Well visits can help you stay healthy. Your doctor has checked your overall health and may have suggested ways to take good care of yourself. Your doctor also may have recommended tests. At home, you can help prevent illness with healthy eating, regular exercise, and other steps. Follow-up care is a key part of your treatment and safety. Be sure to make and go to all appointments, and call your doctor if you are having problems. It's also a good idea to know your test results and keep a list of the medicines you take. How can you care for yourself at home? · Get screening tests that you and your doctor decide on. Screening helps find diseases before any symptoms appear. · Eat healthy foods. Choose fruits, vegetables, whole grains, protein, and low-fat dairy foods. Limit fat, especially saturated fat. Reduce salt in your diet. · Limit alcohol. If you are a man, have no more than 2 drinks a day or 14 drinks a week. If you are a woman, have no more than 1 drink a day or 7 drinks a week. · Get at least 30 minutes of physical activity on most days of the week. Walking is a good choice. You also may want to do other activities, such as running, swimming, cycling, or playing tennis or team sports. Discuss any changes in your exercise program with your doctor. · Reach and stay at a healthy weight. This will lower your risk for many problems, such as obesity, diabetes, heart disease, and high blood pressure. · Do not smoke or allow others to smoke around you. If you need help quitting, talk to your doctor about stop-smoking programs and medicines. These can increase your chances of quitting for good. · Care for your mental health. It is easy to get weighed down by worry and stress.  Learn strategies to manage stress, like deep breathing and mindfulness, and stay connected with your family and community. If you find you often feel sad or hopeless, talk with your doctor. Treatment can help. · Talk to your doctor about whether you have any risk factors for sexually transmitted infections (STIs). You can help prevent STIs if you wait to have sex with a new partner (or partners) until you've each been tested for STIs. It also helps if you use condoms (male or female condoms) and if you limit your sex partners to one person who only has sex with you. Vaccines are available for some STIs, such as HPV. · Use birth control if it's important to you to prevent pregnancy. Talk with your doctor about the choices available and what might be best for you. · If you think you may have a problem with alcohol or drug use, talk to your doctor. This includes prescription medicines (such as amphetamines and opioids) and illegal drugs (such as cocaine and methamphetamine). Your doctor can help you figure out what type of treatment is best for you. · Protect your skin from too much sun. When you're outdoors from 10 a.m. to 4 p.m., stay in the shade or cover up with clothing and a hat with a wide brim. Wear sunglasses that block UV rays. Even when it's cloudy, put broad-spectrum sunscreen (SPF 30 or higher) on any exposed skin. · See a dentist one or two times a year for checkups and to have your teeth cleaned. · Wear a seat belt in the car. When should you call for help? Watch closely for changes in your health, and be sure to contact your doctor if you have any problems or symptoms that concern you. Where can you learn more? Go to https://mahamed.healthSolidX Partners. org and sign in to your [employer] account. Enter P072 in the [employer] box to learn more about \"Well Visit, Ages 25 to 48: Care Instructions. \"     If you do not have an account, please click on the \"Sign Up Now\" link. Current as of: May 27, 2020               Content Version: 12.9  © 1005-2930 Healthwise, Incorporated.    Care instructions adapted under license by [facility] ([facility]). If you have questions about a medical condition or this instruction, always ask your healthcare professional. [address] any warranty or liability for your use of this information.

## 2021-08-04 NOTE — PROGRESS NOTES
Measures Status       Recommendations for screening   Colon Cancer Screen   Last colonoscopy: N/A This test is not clinically indicated   Breast Cancer Screen  Last mammogram: N/A  This test is not clinically indicated   Cervical Cancer Screen   Last PAP smear: 2019 Repeat every 3 years   Osteoporosis Screen   Last DXA scan: N/A This test is not clinically indicated   Diabetes Screen  Glucose (mg/dL)   Date Value   03/21/2019 108 (H)    Test recommended and ordered   Cholesterol Screen  No results found for: CHOL, TRIG, HDL, LDLCALC, LDLDIRECT Test recommended and ordered   Aspirin for Cardiovascular Prevention   No Not indicated   Weight: Body mass index is 45.18 kg/m². 5' 7.5\" (1.715 m)292 lb 12.8 oz (132.8 kg)    Your BMI is 25 or greater, which indicates that you are overweight   Living Will: No       Recommended Immunizations    There is no immunization history for the selected administration types on file for this patient.      Influenza vaccine:  recommended every fall                Wt Readings from Last 3 Encounters:   08/04/21 292 lb 12.8 oz (132.8 kg)   02/14/20 245 lb 1.6 oz (111.2 kg)   07/29/19 203 lb 11.2 oz (92.4 kg)     BP Readings from Last 3 Encounters:   08/04/21 122/82   02/14/20 126/78   07/29/19 112/74     Patient Active Problem List   Diagnosis    Anxiety    Class 3 severe obesity due to excess calories without serious comorbidity with body mass index (BMI) of 45.0 to 49.9 in adult ([facility] [address]       Allergies   Allergen Reactions    Pcn [Penicillins] Swelling     Outpatient Medications Marked as Taking for the 8/4/21 encounter (Office Visit) with Elsworth Galeazzi, APRN - CNP   Medication Sig Dispense Refill    [DISCONTINUED] propranolol (INDERAL) 20 MG tablet Take 1 tablet by mouth 3 times daily as needed (anxiety) 90 tablet 0    venlafaxine (EFFEXOR XR) 75 MG extended release capsule 150 mg   1     Past Medical History:   Diagnosis Date    Abnormal Pap smear     HPV 2010    Abnormal Pap smear Past Surgical History:   Procedure Laterality Date    MOUTH SURGERY       Family History   Problem Relation Age of Onset    Diabetes Maternal Grandmother     Heart Disease Maternal Grandfather     Heart Disease Paternal Grandfather      Social History     Socioeconomic History    Marital status:      Spouse name: Not on file    Number of children: Not on file    Years of education: Not on file    Highest education level: Not on file   Occupational History    Not on file   Tobacco Use    Smoking status: Former Smoker    Smokeless tobacco: Former User     Quit date: 2/18/2009   Substance and Sexual Activity    Alcohol use: No    Drug use: No    Sexual activity: Yes     Partners: Male   Other Topics Concern    Not on file   Social History Narrative    Not on file     Social Determinants of Health     Financial Resource Strain:     Difficulty of Paying Living Expenses:    Food Insecurity:     Worried About Running Out of Food in the Last Year:     Ran Out of Food in the Last Year:    Transportation Needs:     Lack of Transportation (Medical):  Lack of Transportation (Non-Medical):    Physical Activity:     Days of Exercise per Week:     Minutes of Exercise per Session:    Stress:     Feeling of Stress :    Social Connections:     Frequency of Communication with Friends and Family:     Frequency of Social Gatherings with Friends and Family:     Attends Zoroastrian Services:     Active Member of Clubs or Organizations:     Attends Club or Organization Meetings:     Marital Status:    Intimate Partner Violence:     Fear of Current or Ex-Partner:     Emotionally Abused:     Physically Abused:     Sexually Abused:      Review of Systems:  A comprehensive review of systems was negative except for what was noted in the HPI.      Physical Exam:   Vitals:    08/04/21 1323   BP: 122/82   Site: Right Upper Arm   Position: Sitting   Cuff Size: Large Adult   Pulse: 76   Temp: 96.9 °F (36.1 °C)   SpO2: 98%   Weight: 292 lb 12.8 oz (132.8 kg)   Height: 5' 7.5\" (1.715 m)     Body mass index is 45.18 kg/m². Physical Exam  Constitutional:       General: She is not in acute distress. Appearance: Normal appearance. She is well-developed. She is morbidly obese. HENT:      Head: Normocephalic and atraumatic. Right Ear: Hearing, tympanic membrane, ear canal and external ear normal.      Left Ear: Hearing, tympanic membrane, ear canal and external ear normal.      Nose: Nose normal. No mucosal edema. Mouth/Throat:      Pharynx: Uvula midline. Tonsils: No tonsillar exudate. 1+ on the right. 1+ on the left. Eyes:      General: Lids are normal.         Right eye: No discharge. Left eye: No discharge. Conjunctiva/sclera: Conjunctivae normal.      Pupils: Pupils are equal, round, and reactive to light. Neck:      Thyroid: No thyromegaly. Trachea: Trachea normal. No tracheal deviation. Cardiovascular:      Rate and Rhythm: Normal rate and regular rhythm. Heart sounds: Normal heart sounds, S1 normal and S2 normal.   Pulmonary:      Effort: Pulmonary effort is normal. No respiratory distress. Breath sounds: Normal breath sounds. Abdominal:      General: Bowel sounds are normal. There is no distension. Palpations: Abdomen is soft. Tenderness: There is no abdominal tenderness. There is no guarding. Musculoskeletal:         General: Normal range of motion. Cervical back: Normal range of motion. Lymphadenopathy:      Cervical: No cervical adenopathy. Skin:     General: Skin is warm and dry. Neurological:      Mental Status: She is alert and oriented to person, place, and time. Psychiatric:         Thought Content:  Thought content normal.         Judgment: Judgment normal.        Assessment/Plan:    Other Recommendations   · See a dentist every 6 months  · Try to get at least 30 minutes of exercise 3-4 days per week  · Always wear a seat belt when traveling in a car       1. Annual physical exam  Encouraged healthy diet and regular exercise. Due for routine blood work. Due for Mammogram.  Return in 1 year for annual exam.  - CBC Auto Differential; Future  - Comprehensive Metabolic Panel, Fasting; Future  - Lipid, Fasting; Future    2. Anxiety  Stable. Continue on current medication regimen. 3. Class 3 severe obesity due to excess calories without serious comorbidity with body mass index (BMI) of 45.0 to 49.9 in adult [facility])  Encouraged healthy diet and regular exercise. 4. Encounter for screening mammogram for malignant neoplasm of breast  - BALDO DIGITAL SCREEN W OR WO CAD BILATERAL; Future    5. Screening for hyperlipidemia  - Lipid, Fasting; Future    6. Screening for diabetes mellitus  - Comprehensive Metabolic Panel, Fasting;  Future    Return in about 1 year (around 8/4/2022) for annual exam.

## 2021-08-12 PROBLEM — E66.01 CLASS 3 SEVERE OBESITY DUE TO EXCESS CALORIES WITHOUT SERIOUS COMORBIDITY WITH BODY MASS INDEX (BMI) OF 45.0 TO 49.9 IN ADULT (HCC): Status: ACTIVE | Noted: 2021-08-12

## 2021-08-12 PROBLEM — E66.813 CLASS 3 SEVERE OBESITY DUE TO EXCESS CALORIES WITHOUT SERIOUS COMORBIDITY WITH BODY MASS INDEX (BMI) OF 45.0 TO 49.9 IN ADULT: Status: ACTIVE | Noted: 2021-08-12

## 2022-03-05 ENCOUNTER — HOSPITAL ENCOUNTER (INPATIENT)
Age: 42
LOS: 1 days | Discharge: HOME | DRG: 694 | End: 2022-03-06
Attending: EMERGENCY MEDICINE

## 2022-03-05 ENCOUNTER — APPOINTMENT (OUTPATIENT)
Dept: CT IMAGING | Age: 42
DRG: 694 | End: 2022-03-05

## 2022-03-05 DIAGNOSIS — R31.9 URINARY TRACT INFECTION WITH HEMATURIA, SITE UNSPECIFIED: ICD-10-CM

## 2022-03-05 DIAGNOSIS — N13.4 HYDROURETER: ICD-10-CM

## 2022-03-05 DIAGNOSIS — N39.0 URINARY TRACT INFECTION WITH HEMATURIA, SITE UNSPECIFIED: ICD-10-CM

## 2022-03-05 DIAGNOSIS — R10.9 ACUTE RIGHT FLANK PAIN: Primary | ICD-10-CM

## 2022-03-05 DIAGNOSIS — N20.1 RIGHT URETERAL STONE: ICD-10-CM

## 2022-03-05 LAB
A/G RATIO: 1.3 (ref 1.1–2.2)
ALBUMIN SERPL-MCNC: 4.1 G/DL (ref 3.4–5)
ALP BLD-CCNC: 153 U/L (ref 40–129)
ALT SERPL-CCNC: 18 U/L (ref 10–40)
ANION GAP SERPL CALCULATED.3IONS-SCNC: 11 MMOL/L (ref 3–16)
AST SERPL-CCNC: 16 U/L (ref 15–37)
BASOPHILS ABSOLUTE: 0 K/UL (ref 0–0.2)
BASOPHILS RELATIVE PERCENT: 0.3 %
BILIRUB SERPL-MCNC: 0.5 MG/DL (ref 0–1)
BILIRUBIN URINE: NEGATIVE
BLOOD, URINE: ABNORMAL
BUN BLDV-MCNC: 16 MG/DL (ref 7–20)
CALCIUM SERPL-MCNC: 10.1 MG/DL (ref 8.3–10.6)
CHLORIDE BLD-SCNC: 101 MMOL/L (ref 99–110)
CLARITY: ABNORMAL
CO2: 25 MMOL/L (ref 21–32)
COLOR: ABNORMAL
COMMENT UA: ABNORMAL
CREAT SERPL-MCNC: 1.1 MG/DL (ref 0.6–1.1)
EOSINOPHILS ABSOLUTE: 0.5 K/UL (ref 0–0.6)
EOSINOPHILS RELATIVE PERCENT: 4.2 %
EPITHELIAL CELLS, UA: 5 /HPF (ref 0–5)
GFR AFRICAN AMERICAN: >60
GFR NON-AFRICAN AMERICAN: 55
GLUCOSE BLD-MCNC: 108 MG/DL (ref 70–99)
GLUCOSE URINE: NEGATIVE MG/DL
HCG(URINE) PREGNANCY TEST: NEGATIVE
HCT VFR BLD CALC: 41.1 % (ref 36–48)
HEMOGLOBIN: 13.2 G/DL (ref 12–16)
HYALINE CASTS: 0 /LPF (ref 0–8)
KETONES, URINE: NEGATIVE MG/DL
LEUKOCYTE ESTERASE, URINE: NEGATIVE
LYMPHOCYTES ABSOLUTE: 1 K/UL (ref 1–5.1)
LYMPHOCYTES RELATIVE PERCENT: 7.5 %
MCH RBC QN AUTO: 24.5 PG (ref 26–34)
MCHC RBC AUTO-ENTMCNC: 32.1 G/DL (ref 31–36)
MCV RBC AUTO: 76.4 FL (ref 80–100)
MICROSCOPIC EXAMINATION: YES
MONOCYTES ABSOLUTE: 0.8 K/UL (ref 0–1.3)
MONOCYTES RELATIVE PERCENT: 6.2 %
NEUTROPHILS ABSOLUTE: 10.4 K/UL (ref 1.7–7.7)
NEUTROPHILS RELATIVE PERCENT: 81.8 %
NITRITE, URINE: NEGATIVE
PDW BLD-RTO: 15.8 % (ref 12.4–15.4)
PH UA: 6.5 (ref 5–8)
PLATELET # BLD: 368 K/UL (ref 135–450)
PMV BLD AUTO: 7.2 FL (ref 5–10.5)
POTASSIUM REFLEX MAGNESIUM: 5.2 MMOL/L (ref 3.5–5.1)
PROTEIN UA: 30 MG/DL
RBC # BLD: 5.39 M/UL (ref 4–5.2)
RBC UA: >100 /HPF (ref 0–4)
SODIUM BLD-SCNC: 137 MMOL/L (ref 136–145)
SPECIFIC GRAVITY UA: 1.02 (ref 1–1.03)
TOTAL PROTEIN: 7.2 G/DL (ref 6.4–8.2)
URINE REFLEX TO CULTURE: YES
URINE TYPE: ABNORMAL
UROBILINOGEN, URINE: 2 E.U./DL
WBC # BLD: 12.8 K/UL (ref 4–11)
WBC UA: 24 /HPF (ref 0–5)

## 2022-03-05 PROCEDURE — 2580000003 HC RX 258: Performed by: NURSE PRACTITIONER

## 2022-03-05 PROCEDURE — 96374 THER/PROPH/DIAG INJ IV PUSH: CPT

## 2022-03-05 PROCEDURE — 81001 URINALYSIS AUTO W/SCOPE: CPT

## 2022-03-05 PROCEDURE — 2580000003 HC RX 258: Performed by: INTERNAL MEDICINE

## 2022-03-05 PROCEDURE — 36415 COLL VENOUS BLD VENIPUNCTURE: CPT

## 2022-03-05 PROCEDURE — 74176 CT ABD & PELVIS W/O CONTRAST: CPT

## 2022-03-05 PROCEDURE — 84703 CHORIONIC GONADOTROPIN ASSAY: CPT

## 2022-03-05 PROCEDURE — 96375 TX/PRO/DX INJ NEW DRUG ADDON: CPT

## 2022-03-05 PROCEDURE — 80053 COMPREHEN METABOLIC PANEL: CPT

## 2022-03-05 PROCEDURE — 85025 COMPLETE CBC W/AUTO DIFF WBC: CPT

## 2022-03-05 PROCEDURE — 87086 URINE CULTURE/COLONY COUNT: CPT

## 2022-03-05 PROCEDURE — 6370000000 HC RX 637 (ALT 250 FOR IP): Performed by: INTERNAL MEDICINE

## 2022-03-05 PROCEDURE — 87040 BLOOD CULTURE FOR BACTERIA: CPT

## 2022-03-05 PROCEDURE — 6360000002 HC RX W HCPCS: Performed by: NURSE PRACTITIONER

## 2022-03-05 PROCEDURE — 1200000000 HC SEMI PRIVATE

## 2022-03-05 PROCEDURE — 99282 EMERGENCY DEPT VISIT SF MDM: CPT

## 2022-03-05 RX ORDER — KETOROLAC TROMETHAMINE 30 MG/ML
15 INJECTION, SOLUTION INTRAMUSCULAR; INTRAVENOUS ONCE
Status: COMPLETED | OUTPATIENT
Start: 2022-03-05 | End: 2022-03-05

## 2022-03-05 RX ORDER — TAMSULOSIN HYDROCHLORIDE 0.4 MG/1
0.4 CAPSULE ORAL DAILY
Status: DISCONTINUED | OUTPATIENT
Start: 2022-03-05 | End: 2022-03-06 | Stop reason: HOSPADM

## 2022-03-05 RX ORDER — TAMSULOSIN HYDROCHLORIDE 0.4 MG/1
0.4 CAPSULE ORAL DAILY
Status: DISCONTINUED | OUTPATIENT
Start: 2022-03-06 | End: 2022-03-05

## 2022-03-05 RX ORDER — ONDANSETRON 2 MG/ML
4 INJECTION INTRAMUSCULAR; INTRAVENOUS ONCE
Status: COMPLETED | OUTPATIENT
Start: 2022-03-05 | End: 2022-03-05

## 2022-03-05 RX ORDER — MORPHINE SULFATE 4 MG/ML
4 INJECTION, SOLUTION INTRAMUSCULAR; INTRAVENOUS EVERY 4 HOURS PRN
Status: DISCONTINUED | OUTPATIENT
Start: 2022-03-05 | End: 2022-03-06 | Stop reason: HOSPADM

## 2022-03-05 RX ORDER — OXYCODONE HYDROCHLORIDE 5 MG/1
5 TABLET ORAL EVERY 4 HOURS PRN
Status: DISCONTINUED | OUTPATIENT
Start: 2022-03-05 | End: 2022-03-06 | Stop reason: HOSPADM

## 2022-03-05 RX ORDER — 0.9 % SODIUM CHLORIDE 0.9 %
1000 INTRAVENOUS SOLUTION INTRAVENOUS ONCE
Status: COMPLETED | OUTPATIENT
Start: 2022-03-05 | End: 2022-03-05

## 2022-03-05 RX ORDER — MORPHINE SULFATE 4 MG/ML
4 INJECTION, SOLUTION INTRAMUSCULAR; INTRAVENOUS
Status: DISCONTINUED | OUTPATIENT
Start: 2022-03-05 | End: 2022-03-05

## 2022-03-05 RX ORDER — OXYCODONE HYDROCHLORIDE 5 MG/1
10 TABLET ORAL EVERY 4 HOURS PRN
Status: DISCONTINUED | OUTPATIENT
Start: 2022-03-05 | End: 2022-03-06 | Stop reason: HOSPADM

## 2022-03-05 RX ORDER — NALOXONE HYDROCHLORIDE 1 MG/ML
0.4 INJECTION INTRAMUSCULAR; INTRAVENOUS; SUBCUTANEOUS PRN
Status: DISCONTINUED | OUTPATIENT
Start: 2022-03-05 | End: 2022-03-06 | Stop reason: HOSPADM

## 2022-03-05 RX ORDER — MORPHINE SULFATE 2 MG/ML
2 INJECTION, SOLUTION INTRAMUSCULAR; INTRAVENOUS
Status: DISCONTINUED | OUTPATIENT
Start: 2022-03-05 | End: 2022-03-06 | Stop reason: HOSPADM

## 2022-03-05 RX ORDER — SODIUM CHLORIDE, SODIUM LACTATE, POTASSIUM CHLORIDE, CALCIUM CHLORIDE 600; 310; 30; 20 MG/100ML; MG/100ML; MG/100ML; MG/100ML
INJECTION, SOLUTION INTRAVENOUS CONTINUOUS
Status: DISCONTINUED | OUTPATIENT
Start: 2022-03-05 | End: 2022-03-06 | Stop reason: HOSPADM

## 2022-03-05 RX ADMIN — SODIUM CHLORIDE 1000 ML: 9 INJECTION, SOLUTION INTRAVENOUS at 21:29

## 2022-03-05 RX ADMIN — TAMSULOSIN HYDROCHLORIDE 0.4 MG: 0.4 CAPSULE ORAL at 22:52

## 2022-03-05 RX ADMIN — SODIUM CHLORIDE, POTASSIUM CHLORIDE, SODIUM LACTATE AND CALCIUM CHLORIDE 1000 ML: 600; 310; 30; 20 INJECTION, SOLUTION INTRAVENOUS at 22:32

## 2022-03-05 RX ADMIN — ONDANSETRON 4 MG: 2 INJECTION INTRAMUSCULAR; INTRAVENOUS at 21:24

## 2022-03-05 RX ADMIN — KETOROLAC TROMETHAMINE 15 MG: 30 INJECTION, SOLUTION INTRAMUSCULAR; INTRAVENOUS at 21:23

## 2022-03-05 RX ADMIN — Medication 1000 MG: at 21:26

## 2022-03-05 ASSESSMENT — PAIN DESCRIPTION - ONSET: ONSET: GRADUAL

## 2022-03-05 ASSESSMENT — PAIN DESCRIPTION - DESCRIPTORS: DESCRIPTORS: SHARP;ACHING

## 2022-03-05 ASSESSMENT — PAIN DESCRIPTION - PAIN TYPE: TYPE: ACUTE PAIN

## 2022-03-05 ASSESSMENT — PAIN - FUNCTIONAL ASSESSMENT: PAIN_FUNCTIONAL_ASSESSMENT: 0-10

## 2022-03-05 ASSESSMENT — PAIN DESCRIPTION - FREQUENCY: FREQUENCY: CONTINUOUS

## 2022-03-05 ASSESSMENT — PAIN SCALES - GENERAL: PAINLEVEL_OUTOF10: 7

## 2022-03-05 ASSESSMENT — PAIN DESCRIPTION - LOCATION: LOCATION: BACK

## 2022-03-05 ASSESSMENT — PAIN DESCRIPTION - ORIENTATION: ORIENTATION: LOWER;RIGHT

## 2022-03-05 NOTE — ED PROVIDER NOTES
[facility] [facility]        Pt Name: Steve Jimenez  MRN: [id number]  [place] 1980  Date of evaluation: 3/5/2022  Provider: LUZ MARINA Bridges CNP  PCP: LUZ MARINA Blanchard CNP  Note Started: 6:28 PM EST        I have seen and evaluated this patient with my supervising physician Dr. Maureen Latham       Chief Complaint   Patient presents with    Urinary Tract Infection     pt states seen at [facility] a few days ago for back pain, told she has a UTI was started on macrobid. pt states pain is still there. HISTORY OF PRESENT ILLNESS   (Location, Timing/Onset, Context/Setting, Quality, Duration, Modifying Factors, Severity, Associated Signs and Symptoms)  Note limiting factors. Chief Complaint: rt flank pain and UTI     Aurora Reid is a 37 y.o. female who present complaints of right flank pain for the past 3 days. She was seen at the [address] [place] [address] and had a urine dip and was performed she had a UTI was prescribed Macrobid for 5 days. She states urinary symptoms have somewhat improved and she no longer has any dysuria, hematuria, urinary frequency, urinary urgency, decreased output, or bladder pressure. She did have an episode of feeling nauseated today and therefore presented to the emergency department. She does have a history of prior UTIs and states this feels similar. Denies a history of prior pyelonephritis or kidney stones. Denies any associated vaginal bleeding or discharge. LMP was 2 weeks ago. Nursing Notes were all reviewed and agreed with or any disagreements were addressed in the HPI. REVIEW OF SYSTEMS    (2-9 systems for level 4, 10 or more for level 5)     Review of Systems    Positives and Pertinent negatives as per HPI. Except as noted above in the ROS, all other systems were reviewed and negative.        PAST MEDICAL HISTORY     Past Medical History:   Diagnosis Date    Abnormal Pap smear     HPV 2010    Abnormal Pap smear          SURGICAL HISTORY     Past Surgical History:   Procedure Laterality Date    MOUTH SURGERY           CURRENTMEDICATIONS       Previous Medications    PROPRANOLOL (INDERAL) 20 MG TABLET    TAKE 1 TABLET BY MOUTH THREE TIMES DAILY AS NEEDED FOR ANXIETY    VENLAFAXINE (EFFEXOR XR) 75 MG EXTENDED RELEASE CAPSULE    150 mg          ALLERGIES     Pcn [penicillins]    FAMILYHISTORY       Family History   Problem Relation Age of Onset    Diabetes Maternal Grandmother     Heart Disease Maternal Grandfather     Heart Disease Paternal Grandfather           SOCIAL HISTORY       Social History     Tobacco Use    Smoking status: Former Smoker    Smokeless tobacco: Former User     Quit date: 2/18/2009   Substance Use Topics    Alcohol use: No    Drug use: No       SCREENINGS             PHYSICAL EXAM    (up to 7 for level 4, 8 or more for level 5)     ED Triage Vitals [03/05/22 1826]   BP Temp Temp Source Pulse Resp SpO2 Height Weight   126/89 98.7 °F (37.1 °C) Oral 83 16 98 % 5' 7\" (1.702 m) 297 lb 8 oz (134.9 kg)       Physical Exam  Vitals and nursing note reviewed. Constitutional:       General: She is awake. Appearance: Normal appearance. She is well-developed. She is morbidly obese. HENT:      Head: Normocephalic and atraumatic. Nose: Nose normal.   Eyes:      General:         Right eye: No discharge. Left eye: No discharge. Cardiovascular:      Rate and Rhythm: Normal rate and regular rhythm. Heart sounds: Normal heart sounds. Pulmonary:      Effort: Pulmonary effort is normal. No respiratory distress. Breath sounds: Normal breath sounds. Abdominal:      General: Bowel sounds are normal.      Palpations: Abdomen is soft. Tenderness: There is no abdominal tenderness. There is right CVA tenderness. There is no left CVA tenderness. Musculoskeletal:         General: Normal range of motion.       Cervical back: Normal range of motion. Skin:     General: Skin is warm and dry. Coloration: Skin is not pale. Neurological:      Mental Status: She is alert and oriented to person, place, and time. Gait: Gait is intact. Comments: Ambulated to the restroom without difficulty   Psychiatric:         Behavior: Behavior normal. Behavior is cooperative. DIAGNOSTIC RESULTS   LABS:    Labs Reviewed   URINALYSIS WITH REFLEX TO CULTURE - Abnormal; Notable for the following components:       Result Value    Color, UA RED (*)     Clarity, UA TURBID (*)     Blood, Urine LARGE (*)     Protein, UA 30 (*)     Urobilinogen, Urine 2.0 (*)     All other components within normal limits   MICROSCOPIC URINALYSIS - Abnormal; Notable for the following components:    RBC, UA >100 (*)     WBC, UA 24 (*)     All other components within normal limits   CBC WITH AUTO DIFFERENTIAL - Abnormal; Notable for the following components:    WBC 12.8 (*)     RBC 5.39 (*)     MCV 76.4 (*)     MCH 24.5 (*)     RDW 15.8 (*)     Neutrophils Absolute 10.4 (*)     All other components within normal limits   COMPREHENSIVE METABOLIC PANEL W/ REFLEX TO MG FOR LOW K - Abnormal; Notable for the following components:    Potassium reflex Magnesium 5.2 (*)     Glucose 108 (*)     GFR Non-African American 55 (*)     Alkaline Phosphatase 153 (*)     All other components within normal limits   CULTURE, URINE   PREGNANCY, URINE       When ordered only abnormal lab results are displayed. All other labs were within normal range or not returned as of this dictation. EKG: When ordered, EKG's are interpreted by the Emergency Department Physician in the absence of a cardiologist.  Please see their note for interpretation of EKG.     RADIOLOGY:   Non-plain film images such as CT, Ultrasound and MRI are read by the radiologist. Plain radiographic images are visualized and preliminarily interpreted by the ED Provider with the below findings:        Interpretation per the Radiologist below, if available at the time of this note:    CT ABDOMEN PELVIS WO CONTRAST Additional Contrast? None   Final Result      1.  8.5 mm calculus at the right UVJ with resultant marked right   hydronephrosis and hydroureter. 2.  Hypodensity of the liver parenchyma suggests fatty infiltration though   other forms of intrinsic liver disease are not excluded. No focal liver   lesion or bile duct dilation is noted, given limitations due to the lack of   intravenous contrast.           No results found. PROCEDURES   Unless otherwise noted below, none     Procedures    CRITICAL CARE TIME       CONSULTS:  IP CONSULT TO UROLOGY      EMERGENCY DEPARTMENT COURSE and DIFFERENTIAL DIAGNOSIS/MDM:   Vitals:    Vitals:    03/05/22 1826   BP: 126/89   Pulse: 83   Resp: 16   Temp: 98.7 °F (37.1 °C)   TempSrc: Oral   SpO2: 98%   Weight: 297 lb 8 oz (134.9 kg)   Height: 5' 7\" (1.702 m)       Patient was given the following medications:  Medications   0.9 % sodium chloride bolus (has no administration in time range)   ondansetron (ZOFRAN) injection 4 mg (has no administration in time range)   ketorolac (TORADOL) injection 15 mg (has no administration in time range)   cefTRIAXone (ROCEPHIN) 1000 mg in sterile water 10 mL IV syringe (has no administration in time range)           Care of this patient took place during the COVID-19 pandemic emergency. ED COURSE & MEDICAL DECISION MAKING    - The patient presented to the ER with complaints of rt flank pain. Vital signs were reviewed. Exam well-developed, well-nourished female who appears uncomfortable. Peripheral IV placed. Labs, Imaging ordered. - Pertinent Labs & Imaging studies reviewed. (See chart for details)   -  Patient seen and evaluated in the emergency department. -  Triage and nursing notes reviewed and incorporated. -  Old chart records reviewed and incorporated.  -  Dr. Leonie Rodriguez emergency department attending assessed the patient.    -  Differential diagnosis includes: pelvic inflammatory disease, TOA, gonorrhea, chlamydia, trichomonas, ovarian torsion, kidney stone, pyelonephritis, UTI, BV/vaginitis, cervicitis, ovarian cysts, round ligament pain, pregnancy (including ectopic), appendicitis, bowel obstruction, diverticulitis, hernia, gastroenteritis, colitis vs COVID-19  -  Work-up included:  See above  -  ED treatment included: NS, zofran, toradol, rocephin    -  Results discussed with patient. Dedra Abbasi is a 41-year-old female with complaints of right flank pain with urinary symptoms for the past 3 days. She was seen at the urgent care and a urine dip was completed and diagnosed with a UTI. She was given Macrobid for 5 days, however she continues to have the flank pain and developed nausea today and therefore presented to the emergency department. She does have a history of similar symptoms that were attributed to a UTI. On exam she does have right CVA tenderness that is reproducible. She ambulated to restroom providing urine sample without difficulty. Lab work and imaging is obtained. UA with red color, turbid clarity, large blood, 30 protein, 2 urobilinogen, greater than 100 RBC and 24 WBC. hCG negative. CBC with WBC 12.8, RBC 5.39, MCV 76.4, MCH 24.5, RDW 15.8, absolute neutrophil 10.4. CT abdomen pelvis shows an 8.5 mm calculus at the right UVJ with resultant marked right hydronephrosis and hydroureter. Hypodensity of the liver parenchyma suggest fatty infiltrate through other forms of intrinsic liver disease are not excluded. No focal liver lesion or bile ductal dilatation is noted, given limitations due to lack of intravenous contrast.  Urology is consulted. 2000 Pt care turned over to Dr. Rob Bah for urology consult and possible admission. CRITICAL CARE TIME   Total Critical Care time was 28 minutes, excluding separately reportable procedures.   There was a high probability of clinically significant/life threatening deterioration in the patient's condition which required my urgent intervention. FINAL IMPRESSION      1. Hydronephrosis with ureteropelvic junction (UPJ) obstruction    2. Hematuria, unspecified type    3. Acute right flank pain    4. Hydroureter    5.  Ureterolithiasis          DISPOSITION/PLAN   DISPOSITION             (Please note that portions of this note were completed with a voice recognition program.  Efforts were made to edit the dictations but occasionally words are mis-transcribed.)    LUZ MARINA Watts CNP (electronically signed)            LUZ MARINA Watts CNP  03/05/22 2009

## 2022-03-06 VITALS
RESPIRATION RATE: 18 BRPM | HEART RATE: 84 BPM | WEIGHT: 293 LBS | SYSTOLIC BLOOD PRESSURE: 128 MMHG | OXYGEN SATURATION: 96 % | DIASTOLIC BLOOD PRESSURE: 80 MMHG | HEIGHT: 67 IN | BODY MASS INDEX: 45.99 KG/M2 | TEMPERATURE: 97.9 F

## 2022-03-06 PROBLEM — Z78.9 FAILURE OF OUTPATIENT TREATMENT: Status: ACTIVE | Noted: 2022-03-06

## 2022-03-06 PROBLEM — E87.5 HYPERKALEMIA: Status: ACTIVE | Noted: 2022-03-06

## 2022-03-06 PROBLEM — N13.5 URETEROVESICAL JUNCTION (UVJ) OBSTRUCTION: Status: ACTIVE | Noted: 2022-03-06

## 2022-03-06 PROBLEM — N13.30 HYDRONEPHROSIS OF RIGHT KIDNEY: Status: ACTIVE | Noted: 2022-03-06

## 2022-03-06 LAB
A/G RATIO: 1.3 (ref 1.1–2.2)
ALBUMIN SERPL-MCNC: 3.6 G/DL (ref 3.4–5)
ALP BLD-CCNC: 132 U/L (ref 40–129)
ALT SERPL-CCNC: 17 U/L (ref 10–40)
ANION GAP SERPL CALCULATED.3IONS-SCNC: 13 MMOL/L (ref 3–16)
AST SERPL-CCNC: 15 U/L (ref 15–37)
BASOPHILS ABSOLUTE: 0.1 K/UL (ref 0–0.2)
BASOPHILS RELATIVE PERCENT: 0.7 %
BILIRUB SERPL-MCNC: 0.5 MG/DL (ref 0–1)
BUN BLDV-MCNC: 17 MG/DL (ref 7–20)
CALCIUM SERPL-MCNC: 9.1 MG/DL (ref 8.3–10.6)
CHLORIDE BLD-SCNC: 103 MMOL/L (ref 99–110)
CO2: 21 MMOL/L (ref 21–32)
CREAT SERPL-MCNC: 0.9 MG/DL (ref 0.6–1.1)
EOSINOPHILS ABSOLUTE: 0.6 K/UL (ref 0–0.6)
EOSINOPHILS RELATIVE PERCENT: 6.5 %
GFR AFRICAN AMERICAN: >60
GFR NON-AFRICAN AMERICAN: >60
GLUCOSE BLD-MCNC: 98 MG/DL (ref 70–99)
HCT VFR BLD CALC: 38.9 % (ref 36–48)
HEMOGLOBIN: 12.5 G/DL (ref 12–16)
LYMPHOCYTES ABSOLUTE: 1 K/UL (ref 1–5.1)
LYMPHOCYTES RELATIVE PERCENT: 11.1 %
MCH RBC QN AUTO: 24.5 PG (ref 26–34)
MCHC RBC AUTO-ENTMCNC: 32.1 G/DL (ref 31–36)
MCV RBC AUTO: 76.3 FL (ref 80–100)
MONOCYTES ABSOLUTE: 0.7 K/UL (ref 0–1.3)
MONOCYTES RELATIVE PERCENT: 8 %
NEUTROPHILS ABSOLUTE: 6.6 K/UL (ref 1.7–7.7)
NEUTROPHILS RELATIVE PERCENT: 73.7 %
PDW BLD-RTO: 15.8 % (ref 12.4–15.4)
PLATELET # BLD: 349 K/UL (ref 135–450)
PMV BLD AUTO: 7.3 FL (ref 5–10.5)
POTASSIUM REFLEX MAGNESIUM: 4.2 MMOL/L (ref 3.5–5.1)
RBC # BLD: 5.1 M/UL (ref 4–5.2)
SODIUM BLD-SCNC: 137 MMOL/L (ref 136–145)
TOTAL PROTEIN: 6.3 G/DL (ref 6.4–8.2)
URINE CULTURE, ROUTINE: NORMAL
WBC # BLD: 8.9 K/UL (ref 4–11)

## 2022-03-06 PROCEDURE — 2580000003 HC RX 258: Performed by: INTERNAL MEDICINE

## 2022-03-06 PROCEDURE — 6370000000 HC RX 637 (ALT 250 FOR IP): Performed by: INTERNAL MEDICINE

## 2022-03-06 PROCEDURE — 85025 COMPLETE CBC W/AUTO DIFF WBC: CPT

## 2022-03-06 PROCEDURE — 36415 COLL VENOUS BLD VENIPUNCTURE: CPT

## 2022-03-06 PROCEDURE — 80053 COMPREHEN METABOLIC PANEL: CPT

## 2022-03-06 PROCEDURE — 6360000002 HC RX W HCPCS: Performed by: INTERNAL MEDICINE

## 2022-03-06 RX ORDER — ONDANSETRON 4 MG/1
4 TABLET, ORALLY DISINTEGRATING ORAL EVERY 8 HOURS PRN
Status: DISCONTINUED | OUTPATIENT
Start: 2022-03-06 | End: 2022-03-06 | Stop reason: HOSPADM

## 2022-03-06 RX ORDER — POTASSIUM CHLORIDE 20 MEQ/1
40 TABLET, EXTENDED RELEASE ORAL PRN
Status: DISCONTINUED | OUTPATIENT
Start: 2022-03-06 | End: 2022-03-06 | Stop reason: HOSPADM

## 2022-03-06 RX ORDER — SODIUM CHLORIDE 0.9 % (FLUSH) 0.9 %
5-40 SYRINGE (ML) INJECTION EVERY 12 HOURS SCHEDULED
Status: DISCONTINUED | OUTPATIENT
Start: 2022-03-06 | End: 2022-03-06 | Stop reason: HOSPADM

## 2022-03-06 RX ORDER — SODIUM CHLORIDE 0.9 % (FLUSH) 0.9 %
10 SYRINGE (ML) INJECTION PRN
Status: DISCONTINUED | OUTPATIENT
Start: 2022-03-06 | End: 2022-03-06 | Stop reason: HOSPADM

## 2022-03-06 RX ORDER — ACETAMINOPHEN 325 MG/1
650 TABLET ORAL EVERY 6 HOURS PRN
Status: DISCONTINUED | OUTPATIENT
Start: 2022-03-06 | End: 2022-03-06 | Stop reason: HOSPADM

## 2022-03-06 RX ORDER — SODIUM CHLORIDE 9 MG/ML
25 INJECTION, SOLUTION INTRAVENOUS PRN
Status: DISCONTINUED | OUTPATIENT
Start: 2022-03-06 | End: 2022-03-06 | Stop reason: HOSPADM

## 2022-03-06 RX ORDER — VENLAFAXINE HYDROCHLORIDE 150 MG/1
150 CAPSULE, EXTENDED RELEASE ORAL
Status: DISCONTINUED | OUTPATIENT
Start: 2022-03-06 | End: 2022-03-06 | Stop reason: HOSPADM

## 2022-03-06 RX ORDER — OXYCODONE HYDROCHLORIDE 5 MG/1
5 TABLET ORAL EVERY 4 HOURS PRN
Qty: 20 TABLET | Refills: 0 | Status: SHIPPED | OUTPATIENT
Start: 2022-03-06 | End: 2022-03-20

## 2022-03-06 RX ORDER — ONDANSETRON 2 MG/ML
4 INJECTION INTRAMUSCULAR; INTRAVENOUS EVERY 6 HOURS PRN
Status: DISCONTINUED | OUTPATIENT
Start: 2022-03-06 | End: 2022-03-06 | Stop reason: HOSPADM

## 2022-03-06 RX ORDER — POLYETHYLENE GLYCOL 3350 17 G/17G
17 POWDER, FOR SOLUTION ORAL DAILY PRN
Status: DISCONTINUED | OUTPATIENT
Start: 2022-03-06 | End: 2022-03-06 | Stop reason: HOSPADM

## 2022-03-06 RX ORDER — TAMSULOSIN HYDROCHLORIDE 0.4 MG/1
0.4 CAPSULE ORAL DAILY
Qty: 7 CAPSULE | Refills: 3 | Status: SHIPPED | OUTPATIENT
Start: 2022-03-07

## 2022-03-06 RX ORDER — POTASSIUM CHLORIDE 7.45 MG/ML
10 INJECTION INTRAVENOUS PRN
Status: DISCONTINUED | OUTPATIENT
Start: 2022-03-06 | End: 2022-03-06 | Stop reason: HOSPADM

## 2022-03-06 RX ORDER — KETOROLAC TROMETHAMINE 30 MG/ML
30 INJECTION, SOLUTION INTRAMUSCULAR; INTRAVENOUS EVERY 6 HOURS PRN
Status: DISCONTINUED | OUTPATIENT
Start: 2022-03-06 | End: 2022-03-06 | Stop reason: HOSPADM

## 2022-03-06 RX ORDER — ACETAMINOPHEN 650 MG/1
650 SUPPOSITORY RECTAL EVERY 6 HOURS PRN
Status: DISCONTINUED | OUTPATIENT
Start: 2022-03-06 | End: 2022-03-06 | Stop reason: HOSPADM

## 2022-03-06 RX ORDER — PROPRANOLOL HYDROCHLORIDE 20 MG/1
20 TABLET ORAL 3 TIMES DAILY
Status: DISCONTINUED | OUTPATIENT
Start: 2022-03-06 | End: 2022-03-06 | Stop reason: HOSPADM

## 2022-03-06 RX ORDER — MAGNESIUM SULFATE IN WATER 40 MG/ML
2000 INJECTION, SOLUTION INTRAVENOUS PRN
Status: DISCONTINUED | OUTPATIENT
Start: 2022-03-06 | End: 2022-03-06 | Stop reason: HOSPADM

## 2022-03-06 RX ORDER — ONDANSETRON 4 MG/1
4 TABLET, ORALLY DISINTEGRATING ORAL EVERY 8 HOURS PRN
Qty: 6 TABLET | Refills: 0 | Status: SHIPPED | OUTPATIENT
Start: 2022-03-06

## 2022-03-06 RX ADMIN — PROPRANOLOL HYDROCHLORIDE 20 MG: 20 TABLET ORAL at 08:13

## 2022-03-06 RX ADMIN — TAMSULOSIN HYDROCHLORIDE 0.4 MG: 0.4 CAPSULE ORAL at 08:13

## 2022-03-06 RX ADMIN — KETOROLAC TROMETHAMINE 30 MG: 30 INJECTION, SOLUTION INTRAMUSCULAR; INTRAVENOUS at 09:53

## 2022-03-06 RX ADMIN — SODIUM CHLORIDE, PRESERVATIVE FREE 10 ML: 5 INJECTION INTRAVENOUS at 08:13

## 2022-03-06 RX ADMIN — SODIUM CHLORIDE, POTASSIUM CHLORIDE, SODIUM LACTATE AND CALCIUM CHLORIDE: 600; 310; 30; 20 INJECTION, SOLUTION INTRAVENOUS at 08:16

## 2022-03-06 RX ADMIN — OXYCODONE 5 MG: 5 TABLET ORAL at 08:13

## 2022-03-06 RX ADMIN — VENLAFAXINE HYDROCHLORIDE 150 MG: 150 CAPSULE, EXTENDED RELEASE ORAL at 08:13

## 2022-03-06 ASSESSMENT — PAIN SCALES - GENERAL
PAINLEVEL_OUTOF10: 6
PAINLEVEL_OUTOF10: 6
PAINLEVEL_OUTOF10: 0
PAINLEVEL_OUTOF10: 6
PAINLEVEL_OUTOF10: 5

## 2022-03-06 ASSESSMENT — PAIN DESCRIPTION - PAIN TYPE
TYPE: ACUTE PAIN
TYPE: ACUTE PAIN

## 2022-03-06 ASSESSMENT — PAIN DESCRIPTION - DESCRIPTORS
DESCRIPTORS: DISCOMFORT
DESCRIPTORS: ACHING;SHARP

## 2022-03-06 ASSESSMENT — PAIN DESCRIPTION - ORIENTATION
ORIENTATION: RIGHT
ORIENTATION: LOWER;RIGHT

## 2022-03-06 ASSESSMENT — PAIN DESCRIPTION - LOCATION
LOCATION: ABDOMEN
LOCATION: BACK

## 2022-03-06 ASSESSMENT — PAIN DESCRIPTION - FREQUENCY: FREQUENCY: CONTINUOUS

## 2022-03-06 ASSESSMENT — PAIN DESCRIPTION - ONSET: ONSET: GRADUAL

## 2022-03-06 NOTE — PROGRESS NOTES
Admission and assessment complete. Pt oriented to room, no complaints of pain at this time. Ambulates independently in the room. Pt verbalized understanding of NPO diet. Care plan and education reviewed and mutually agreed upon with patient.

## 2022-03-06 NOTE — ED PROVIDER NOTES
In addition to the advanced practice provider, I personally saw Aurora Reid and performed a substantive portion of the visit including all aspects of the medical decision making. Briefly, this is a 37 y.o. female here for right flank pain ongoing for the past 3 days. Patient was seen at urgent care at time of symptom onset, states was told she had a urinary tract infection after a urine dipstick test and was started on Macrobid, however states her symptoms have been worsening. Associated with nausea and vomiting, patient also reports subjective fevers with \"low-grade temp. \"  Denies history of similar symptoms. No history of kidney stones in the past.    On exam, patient afebrile and nontoxic. No distress. Heart RRR. Lungs CTAB. Abdomen soft, nondistended, tender to palpation right sided abdomen, no focal right lower quadrant tenderness. Negative Rovsing. Exquisite tenderness overlying right CVA. Screenings   Augusto Coma Scale  Eye Opening: Spontaneous  Best Verbal Response: Oriented  Best Motor Response: Obeys commands  [place] Coma Scale Score: 15        MDM    Patient currently afebrile and nontoxic. She appears uncomfortable however is in no sanjeev distress. No peritoneal signs on abdominal exam.  Pregnancy negative. My clinical suspicion for acute cholecystitis, appendicitis, obstruction or perforation is very low. CT abdomen shows 8.5 mm right ureteric stone with resulting hydroureter and hydronephrosis. Laboratory work-up with mild leukocytosis, normal renal function, no clinically significant electrolyte derangement. Urinalysis with 24 WBCs. I discussed case with Dr. Titus Chapa for urology, does not feel urinalysis results clearly indicate infection however if patient does require admission recommends ceftriaxone and will evaluate tomorrow.   Patient did report continued pain despite ED treatment, I am also concerned her leukocytosis and subjective fevers may represent underlying infection although this is not clearly evident on her urinalysis. May be incompletely treated on current Macrobid. She is certainly not septic. I feel it is prudent to place patient in observation for further evaluation and care, patient is agreeable with this plan. Case discussed with internal medicine team who will admit. Patient remained alert, hemodynamically stable and in no distress over her emergency department course. Patient Referrals:  No follow-up provider specified. Discharge Medications:  Current Discharge Medication List          FINAL IMPRESSION  1. Acute right flank pain    2. Hydroureter    3. Right ureteral stone    4. Urinary tract infection with hematuria, site unspecified        Blood pressure 126/77, pulse 79, temperature 98.2 °F (36.8 °C), temperature source Oral, resp. rate 17, height 5' 7\" (1.702 m), weight (!) 304 lb 3.8 oz (138 kg), SpO2 95 %, unknown if currently breastfeeding. For further details of Aurora Reid's emergency department encounter, please see documentation by advanced practice provider, Amanda Titus NP.     Alivia Modi DO (electronically signed)  Attending Emergency Physician       Alivia Modi DO  03/06/22 7821

## 2022-03-06 NOTE — H&P
Hospital Medicine History and Physical    3/5/2022    Date of Admission: 3/5/2022    Date of Service: Pt seen/examined on 3/5/2022 and admitted to inpatient. Assessment/plan:  Complicated urinary tract infection in the setting of right UVJ stone, hydronephrosis and hydroureter. Patient has been started on Rocephin from the emergency room; will continue. Start lactated Ringer infusion. As stated Tylenol, oxycodone, morphine, ordered for pain. Flomax ordered. N.p.o. after midnight. Right UVJ stone. Management as noted above. Urology evaluation in the morning. Right hydronephrosis and hydroureter. Management as noted above. Mild hyperkalemia, potassium 5.2. Continue intravenous fluid and recheck potassium level in the morning. Other comorbidities: morbid obesity with BMI 46.6 kg/m². Activities: Up with assist  Prophylaxis: Subcutaneous Lovenox  Code status: Full code    ==========================================================  Chief complaint:  Chief Complaint   Patient presents with    Urinary Tract Infection     pt states seen at [facility] a few days ago for back pain, told she has a UTI was started on macrobid. pt states pain is still there. History of Presenting Illness: This is a pleasant 37 y.o. female who presents to the emergency room with complaints of right flank pain, nausea, vomiting, fever, ongoing for the past 3 days. She was recently evaluated at an urgent care where she was diagnosed with urinary tract infection, prescribed Macrobid. She has been taking antibiotics with improvement of urinary symptoms; however, right flank pain persists. Urinalysis obtained in the emergency room today is abnormal, concerning for urinary tract infection. CT-abdomen/pelvis reveals 8.5 mm calculus at the right UVJ with associated right hydronephrosis and hydroureter.   Urologist has been consulted from the emergency room, recommends admission with plan for further intervention in the morning. Patient is receiving a dose of Rocephin in the emergency room      Past Medical History:      Diagnosis Date    Abnormal Pap smear     HPV 2010    Abnormal Pap smear        Past Surgical History:      Procedure Laterality Date    MOUTH SURGERY         Medications (prior to admission):  Prior to Admission medications    Medication Sig Start Date End Date Taking? Authorizing Provider   propranolol (INDERAL) 20 MG tablet TAKE 1 TABLET BY MOUTH THREE TIMES DAILY AS NEEDED FOR ANXIETY 1/3/22  Yes Alverna Fidel, APRN - CNP   venlafaxine (EFFEXOR XR) 75 MG extended release capsule 150 mg  7/7/19  Yes Historical Provider, MD       Allergy(ies):  Pcn [penicillins]    Social History:  TOBACCO:  reports that she has quit smoking. She quit smokeless tobacco use about 13 years ago. ETOH:  reports no history of alcohol use. Family History:      Problem Relation Age of Onset    Diabetes Maternal Grandmother     Heart Disease Maternal Grandfather     Heart Disease Paternal Grandfather        Review of Systems:  Pertinent positives are listed in HPI. At least 10-point ROS reviewed and were negative. Vitals and physical examination:  /89   Pulse 83   Temp 98.7 °F (37.1 °C) (Oral)   Resp 16   Ht 5' 7\" (1.702 m)   Wt 297 lb 8 oz (134.9 kg)   SpO2 98%   BMI 46.60 kg/m²   Gen/overall appearance: Not in acute distress. Alert. Oriented x3. Head: Normocephalic, atraumatic  Eyes: EOMI, good acuity  ENT: Oral mucosa moist  Neck: No JVD, thyromegaly  CVS: Nml S1S2, no MRG, RRR  Pulm: Clear bilaterally. No crackles/wheezes  Gastrointestinal: Soft, NT/ND, +BS  Musculoskeletal: No edema. Warm  Neuro: No focal deficit. Moves extremity spontaneously. Psychiatry: Appropriate affect. Not agitated. Skin: Warm, dry with normal turgor.  No rash  Capillary refill: Brisk,< 3 seconds   Peripheral Pulses: +2 palpable, equal bilaterally      Labs/imaging/EKG:  CBC:   Recent Labs     03/05/22 1917   WBC 12.8*   HGB 13.2      BMP:    Recent Labs     03/05/22 1917      K 5.2*      CO2 25   BUN 16   CREATININE 1.1   GLUCOSE 108*     Hepatic:   Recent Labs     03/05/22 1917   AST 16   ALT 18   BILITOT 0.5   ALKPHOS 153*       CT ABDOMEN PELVIS WO CONTRAST Additional Contrast? None    Result Date: 3/5/2022  EXAM: CT Abdomen and Pelvis Without Intravenous Contrast EXAM DATE/TIME: 3/5/2022 7:12 pm CLINICAL HISTORY: ORDERING SYSTEM PROVIDED  rt flank pain, r/o kidney stone  TECHNOLOGIST PROVIDED HISTORY:  Reason for exam:->rt flank pain, r/o kidney stone Additional Contrast?->None  Decision Support Exception - unselect if not a suspected or confirmed emergency medical condition->Emergency Medical Condition (MA)  Is the patient pregnant?->No  Reason for Exam: Urinary Tract Infection (pt states seen at [facility] a few days ago for back pain, told she has a UTI was started on macrobid. pt states pain is still there. ) TECHNIQUE: Axial computed tomography images of the abdomen and pelvis without intravenous contrast.  This CT exam was performed using one or more of the following dose reduction techniques:  automated exposure control, adjustment of the mA and/or kV according to patient size, and/or use of iterative reconstruction technique. COMPARISON: No relevant prior studies available. FINDINGS: Lung bases:  No acute findings. No mass. No consolidation. ABDOMEN: Liver:  Liver parenchyma is hypodense but without focal liver lesion, given limitations due to the lack of intravenous contrast. Gallbladder and bile ducts:  No acute findings. No calcified stones. No ductal dilation. Pancreas:  No acute findings. No ductal dilation. Spleen:  No acute findings. No splenomegaly. Adrenals:  No acute findings. No mass. Kidneys and ureters:  8.5 mm calculus at the right UVJ with resultant marked right hydronephrosis and hydroureter.   Hyperdensity within the mid upper pole the left kidney appears to be parenchymal and not definitively a stone. Stomach and bowel:  A few colonic diverticuli are noted but no evidence of acute diverticulitis. Bowel gas pattern is nonspecific. No obstruction. PELVIS: Appendix:  A normal appearing appendix is noted. Bladder:  No acute findings. No stones. Reproductive:  Unremarkable as visualized. ABDOMEN and PELVIS: Intraperitoneal space:  No acute findings. No free air. No significant fluid collection. Bones/joints:  No acute fracture. No dislocation. Soft tissues:  No acute findings. Vasculature:  No acute findings. No abdominal aortic aneurysm. Lymph nodes:  No acute findings. No enlarged lymph nodes. 1.  8.5 mm calculus at the right UVJ with resultant marked right hydronephrosis and hydroureter. 2.  Hypodensity of the liver parenchyma suggests fatty infiltration though other forms of intrinsic liver disease are not excluded. No focal liver lesion or bile duct dilation is noted, given limitations due to the lack of intravenous contrast.       Discussed with ER physician.       Thank you LUZ MARINA Roland - JIMMY for the opportunity to be involved in this patient's care.    -----------------------------  Saul Castro MD  [facility] hospitalist

## 2022-03-06 NOTE — CONSULTS
[place] [address]                                  CONSULTATION    PATIENT NAME: HILARIO REGALADO                          :        1980  MED REC NO:   [id number]                          ROOM:       [facility]  ACCOUNT NO:   [de-identified]                           ADMIT DATE: 2022  PROVIDER:     Radha Denise MD    CONSULT DATE:  2022    REASON FOR CONSULTATION:  Ureteral calculi, renal colic. CONSULTATION NOTE:  The patient is a 60-year-old woman who presented to  emergency room at [facility] on the afternoon of 2022 with  right flank pain. She had no history of any previous stones. She  states she was diagnosed with urinary tract infection several days  previously has had pain off and on with some nausea for the last two to  three days. In the emergency room, she was seen and evaluated. Her  white count was 12,000 to 13,000. Her urine showed blood but negative  for nitrites, negative for leukocytes. She had no fever. CT scan  showed an 8.5 mm stone in the distal right ureter with hydronephrosis. She was admitted to the hospital and urologic consultation was made. Patient has had no history of any previous stones. We really do not  know history of any fever or chills. She states she may have had a  low-grade fever on Wednesday; but nothing recently. Since her  hospitalization overnight, she has been afebrile. Her white count this  morning is down to normal.  She actually has minimal pain at this point. PAST MEDICAL HISTORY:  Remarkable for anxiety. PAST SURGICAL HISTORY:  She has only had oral surgery. MEDICATIONS:  She takes Effexor 150 mg daily and Inderal three times a  day as needed for anxiety. ALLERGIES:  She has an allergy to PENICILLIN. SOCIAL HISTORY:  She was in [place], nonsmoker. FAMILY HISTORY:  Positive for stones in the brother.     REVIEW OF SYSTEMS:

## 2022-03-06 NOTE — PROGRESS NOTES
Shift assessment complete, VSS. Pt has c/o pain 6/10 in right lower back. Oxycodone given with minimal relief, Toradol ordered and given with better pain relief reported. All other meds given per [facility] Urology saw pt this am, suggests she goes home today and has procedure for stone removal Monday or Tuesday. Pt is agreable. The care plan and education has been reviewed and mutually agreed upon with the patient.

## 2022-03-09 LAB — BLOOD CULTURE, ROUTINE: NORMAL

## 2022-03-10 LAB — CULTURE, BLOOD 2: NORMAL

## 2022-03-27 NOTE — DISCHARGE SUMMARY
[facility]                     [facility], [address]                               DISCHARGE SUMMARY    PATIENT NAME: HILARIO REGALADO                          :        1980  MED REC NO:   [id number]                          ROOM:       [id number]  ACCOUNT NO:   [de-identified]                           ADMIT DATE: 2022  PROVIDER:     Lisa Ayala MD                  DISCHARGE DATE:  2022    ADMISSION DIAGNOSIS:  Ureteral calculi. DISCHARGE DIAGNOSIS:  Ureteral calculi. OPERATIVE PROCEDURE:  None. CONDITION ON DISCHARGE:  Improved. ACTIVITY RESTRICTIONS:  None. DIETARY RESTRICTIONS:  None. The patient will call the office tomorrow for possible ureteroscopy as  an outpatient. HOSPITAL COURSE:  The patient is a 60-year-old woman who came to the  emergency room who was admitted on the evening of 2022 with  ureteral colic. She had an 8 mm stone. She was seen the following  morning. CAT scan showed an 8.5 mm stone in the distal ureter with  hydronephrosis. Overnight, she had no pain. We discussed options. There were no signs of any infection even though the patient has been  admitted for possible urinary tract infection. She was completely pain  free. We discussed doing the procedure, but did not see an indication  to do it emergently on the weekend on a  since she was pain free  and no signs of infection. Discussed options with her and she was  anxious to go home. She was sent home with pain medicine, Flomax, and  will call the office tomorrow morning to arrange for ureteroscopy as an  outpatient.         Julia Hendrix MD    D: 2022 10:46:01       T: 2022 2:14:54     [email or web address]  Job#: [id number]     Doc#: [id number]    CC:

## 2022-10-31 RX ORDER — PROPRANOLOL HYDROCHLORIDE 20 MG/1
TABLET ORAL
Qty: 90 TABLET | Refills: 2 | OUTPATIENT
Start: 2022-10-31

## 2022-11-09 ENCOUNTER — TELEPHONE (OUTPATIENT)
Dept: FAMILY MEDICINE CLINIC | Age: 42
End: 2022-11-09

## 2022-11-09 RX ORDER — PROPRANOLOL HYDROCHLORIDE 20 MG/1
TABLET ORAL
Qty: 90 TABLET | Refills: 0 | Status: SHIPPED | OUTPATIENT
Start: 2022-11-09 | End: 2022-11-09 | Stop reason: SDUPTHER

## 2022-11-09 RX ORDER — PROPRANOLOL HYDROCHLORIDE 20 MG/1
TABLET ORAL
Qty: 90 TABLET | Refills: 0 | Status: SHIPPED | OUTPATIENT
Start: 2022-11-09

## 2022-11-09 NOTE — TELEPHONE ENCOUNTER
----- Message from Nasima Cam sent at 11/9/2022 11:16 AM EST -----  Subject: Medication Problem    Medication: propranolol (INDERAL) 20 MG tablet  Dosage: 20 mg  Ordering Provider: Gloria Martin    Question/Problem: Patient would like to know since she has an appointment   on the books can it be filled prior being seen. Patient has eight days   worth of medication aware it's up to provider.   Additional Information for Provider:     Pharmacy: [facility] Janett Vasquez 1 Annette Adamson [phone]    ---------------------------------------------------------------------------  --------------  Jaylen LENZ  [phone]; OK to leave message on voicemail  ---------------------------------------------------------------------------  --------------    SCRIPT ANSWERS  Relationship to Patient: Self

## 2022-11-09 NOTE — TELEPHONE ENCOUNTER
Medication:   Requested Prescriptions     Pending Prescriptions Disp Refills    propranolol (INDERAL) 20 MG tablet 90 tablet 0     Sig: TAKE 1 TABLET BY MOUTH THREE TIMES DAILY AS NEEDED FOR ANXIETY      Last Filled:      Patient Phone Number: [phone] (home)     Last appt: 8/4/2021   Next appt: 12/8/2022    Last OARRS: No flowsheet data found.   PDMP Monitoring:    Last PDMP Clive regalado Reviewed [facility]):  Review User Review Instant Review Result   Colletta Bevels, MEGAN R 8/4/2021  1:38 PM Reviewed PDMP [1]     Preferred Pharmacy:   [place] [address] #[id number] [address] [address]  Phone: [phone] Fax: [phone]    [facility] [id number] [place] [address] [phone] [facility] [phone]  [address] [place] [address] [address] [address]  Phone: [phone] Fax: [facility] Av[address]lyn [address] [phone]  [address] [place] [address]  Phone: [phone] Fax: [phone]

## 2023-01-05 ENCOUNTER — OFFICE VISIT (OUTPATIENT)
Dept: FAMILY MEDICINE CLINIC | Age: 43
End: 2023-01-05

## 2023-01-05 VITALS
BODY MASS INDEX: 44.41 KG/M2 | HEART RATE: 70 BPM | HEIGHT: 68 IN | RESPIRATION RATE: 12 BRPM | SYSTOLIC BLOOD PRESSURE: 102 MMHG | DIASTOLIC BLOOD PRESSURE: 78 MMHG | OXYGEN SATURATION: 98 % | WEIGHT: 293 LBS | TEMPERATURE: 97.2 F

## 2023-01-05 DIAGNOSIS — M54.32 SCIATICA OF LEFT SIDE: ICD-10-CM

## 2023-01-05 DIAGNOSIS — Z00.00 ANNUAL PHYSICAL EXAM: Primary | ICD-10-CM

## 2023-01-05 DIAGNOSIS — Z13.29 SCREENING FOR HYPOTHYROIDISM: ICD-10-CM

## 2023-01-05 DIAGNOSIS — E66.01 CLASS 3 SEVERE OBESITY DUE TO EXCESS CALORIES WITHOUT SERIOUS COMORBIDITY WITH BODY MASS INDEX (BMI) OF 45.0 TO 49.9 IN ADULT (HCC): ICD-10-CM

## 2023-01-05 DIAGNOSIS — F41.9 ANXIETY: ICD-10-CM

## 2023-01-05 DIAGNOSIS — Z12.31 ENCOUNTER FOR SCREENING MAMMOGRAM FOR MALIGNANT NEOPLASM OF BREAST: ICD-10-CM

## 2023-01-05 DIAGNOSIS — Z13.220 SCREENING FOR HYPERLIPIDEMIA: ICD-10-CM

## 2023-01-05 PROCEDURE — 99396 PREV VISIT EST AGE 40-64: CPT | Performed by: NURSE PRACTITIONER

## 2023-01-05 RX ORDER — CYCLOBENZAPRINE HCL 10 MG
10 TABLET ORAL 3 TIMES DAILY PRN
Qty: 30 TABLET | Refills: 0 | Status: SHIPPED | OUTPATIENT
Start: 2023-01-05 | End: 2023-01-15

## 2023-01-05 ASSESSMENT — PATIENT HEALTH QUESTIONNAIRE - PHQ9
SUM OF ALL RESPONSES TO PHQ QUESTIONS 1-9: 0
SUM OF ALL RESPONSES TO PHQ QUESTIONS 1-9: 0
SUM OF ALL RESPONSES TO PHQ9 QUESTIONS 1 & 2: 0
SUM OF ALL RESPONSES TO PHQ QUESTIONS 1-9: 0
1. LITTLE INTEREST OR PLEASURE IN DOING THINGS: 0
2. FEELING DOWN, DEPRESSED OR HOPELESS: 0
SUM OF ALL RESPONSES TO PHQ QUESTIONS 1-9: 0

## 2023-01-05 NOTE — PROGRESS NOTES
History and Physical     Aurora Reid   YOB: 1980    Date of Service: 1/5/2023     Chief Complaint: Sonya Whittaker is a 40 y.o. female who presents for a complete physical examination. HPI: Presents to clinic today for annual exam and follow up on chronic health conditions. Anxiety  Continues with Effexor XR - prescribed by Psychiatrist and following with counseling services rarely. Feeling much better. Does take propranolol in the morning which is helpful. Kidney Stone  Reports was recently passed a stone and doing much better now. Diet: Poor - eats fast food regularly. Exercise: no regular exercise - stays active with work. Occupation:  - full time. Hobbies: Spending time with family and friends. Outdoor activities. Family life: , 2 children, lives in house, 2 dog. Medium - kid stressors. Currently anxiety well controlled. Self breast exam: Yes  Last eye exam: 2020 - wears contacts and glasses. Last dental exam: 2022    Preventative Care:  Health Maintenance   Topic Date Due    COVID-19 Vaccine (1) Never done    Varicella vaccine (1 of 2 - 2-dose childhood series) Never done    Hepatitis C screen  Never done    DTaP/Tdap/Td vaccine (1 - Tdap) Never done    Cervical cancer screen  06/27/2016    Lipids  Never done    Flu vaccine (1) Never done    Depression Screen  01/05/2024    HIV screen  Completed    Hepatitis A vaccine  Aged Out    Hib vaccine  Aged Out    Meningococcal (ACWY) vaccine  Aged Out    Pneumococcal 0-64 years Vaccine  Aged Out      Family History:  Colon Cancer: Paternal aunt - dx in her 46s.    Thyroid Cancer/Disease: None  Melanoma: None  Breast/Uterine/Ovarian/Endometrial Cancer: None    Preventative plan of care for Aurora Reid, 1/5/2023     Preventative Measure Status Recommendations for screening   Colon Cancer Screen  Last colonoscopy: N/A This test is not clinically indicated   Breast Cancer Screen  Last mammogram: N/A Test recommended and ordered   Cervical Cancer Screen  Last PAP smear: 2021 Repeat every 3 years   Osteoporosis Screen  Last DXA scan: N/A This test is not clinically indicated   Diabetes Screen  Lab Results   Component Value Date/Time    GLUCOSE 98 03/06/2022 07:37 AM      Repeat yearly   Cholesterol Screen  No results found for: CHOL, TRIG, HDL, LDLCALC, LDLDIRECT Repeat yearly   Aspirin for Cardiovascular Prevention  No Not indicated   Weight: Body mass index is 47.47 kg/m². Last HT:5' 8\" (1.727 m)   Last WT:(!) 312 lb 3.2 oz (141.6 kg)  Your BMI is 25 or greater, which indicates that you are overweight   Living Will: No Additional information provided    Recommended Immunizations   There is no immunization history for the selected administration types on file for this patient.   Influenza vaccine:  recommended every fall     Wt Readings from Last 3 Encounters:   01/05/23 (!) 312 lb 3.2 oz (141.6 kg)   03/06/22 (!) 304 lb 3.8 oz (138 kg)   08/04/21 292 lb 12.8 oz (132.8 kg)     BP Readings from Last 3 Encounters:   01/05/23 102/78   03/06/22 128/80   08/04/21 122/82       Patient Active Problem List   Diagnosis    Anxiety    Morbid obesity with BMI of 45.0-49.9, adult ([facility] [address]    Complicated UTI (urinary tract infection)    Failure of outpatient treatment    Ureterovesical junction (UVJ) obstruction    Hydronephrosis of right kidney    Hyperkalemia        Allergies   Allergen Reactions    Pcn [Penicillins] Swelling     Outpatient Medications Marked as Taking for the 1/5/23 encounter (Office Visit) with LUZ MARINA Yates CNP   Medication Sig Dispense Refill    propranolol (INDERAL) 20 MG tablet TAKE 1 TABLET BY MOUTH THREE TIMES DAILY AS NEEDED FOR ANXIETY 90 tablet 0    venlafaxine (EFFEXOR XR) 75 MG extended release capsule 150 mg   1       Past Medical History:   Diagnosis Date    Abnormal Pap smear     HPV 2010    Abnormal Pap smear      Past Surgical History:   Procedure Laterality Date    MOUTH SURGERY       Family History Problem Relation Age of Onset    Diabetes Maternal Grandmother     Heart Disease Maternal Grandfather     Heart Disease Paternal Grandfather      Social History     Socioeconomic History    Marital status:      Spouse name: Not on file    Number of children: Not on file    Years of education: Not on file    Highest education level: Not on file   Occupational History    Not on file   Tobacco Use    Smoking status: Former    Smokeless tobacco: Former     Quit date: 2/18/2009   Substance and Sexual Activity    Alcohol use: No    Drug use: No    Sexual activity: Yes     Partners: Male   Other Topics Concern    Not on file   Social History Narrative    Not on file     Social Determinants of Health     Financial Resource Strain: Not on file   Food Insecurity: Not on file   Transportation Needs: Not on file   Physical Activity: Not on file   Stress: Not on file   Social Connections: Not on file   Intimate Partner Violence: Not on file   Housing Stability: Not on file       Review of Systems:  A comprehensive review of systems was negative except for what is noted in the HPI. Physical Exam:  Vitals:    01/05/23 0938   BP: 102/78   Site: Left Upper Arm   Position: Sitting   Cuff Size: Large Adult   Pulse: 70   Resp: 12   Temp: 97.2 °F (36.2 °C)   TempSrc: Infrared   SpO2: 98%   Weight: (!) 312 lb 3.2 oz (141.6 kg)   Height: 5' 8\" (1.727 m)      Body mass index is 47.47 kg/m². Physical Exam  Constitutional:       General: She is not in acute distress. Appearance: Normal appearance. She is well-developed. HENT:      Head: Normocephalic and atraumatic. Right Ear: Hearing, tympanic membrane, ear canal and external ear normal.      Left Ear: Hearing, tympanic membrane, ear canal and external ear normal.      Nose: Nose normal. No mucosal edema. Mouth/Throat:      Pharynx: Uvula midline. Tonsils: No tonsillar exudate. 1+ on the right. 1+ on the left.    Eyes:      General: Lids are normal. Right eye: No discharge. Left eye: No discharge. Conjunctiva/sclera: Conjunctivae normal.      Pupils: Pupils are equal, round, and reactive to light. Neck:      Thyroid: No thyromegaly. Trachea: Trachea normal. No tracheal deviation. Cardiovascular:      Rate and Rhythm: Normal rate and regular rhythm. Heart sounds: Normal heart sounds, S1 normal and S2 normal.   Pulmonary:      Effort: Pulmonary effort is normal. No respiratory distress. Breath sounds: Normal breath sounds. Abdominal:      General: Bowel sounds are normal. There is no distension. Palpations: Abdomen is soft. Tenderness: There is no abdominal tenderness. There is no guarding. Musculoskeletal:         General: Normal range of motion. Cervical back: Normal range of motion. Lymphadenopathy:      Cervical: No cervical adenopathy. Skin:     General: Skin is warm and dry. Neurological:      Mental Status: She is alert and oriented to person, place, and time. Psychiatric:         Thought Content: Thought content normal.         Judgment: Judgment normal.      Assessment/Plan:    Other Recommendations:   See a dentist every 6 months  Try to get at least 30 minutes of exercise 3-4 days per week  Always wear a seat belt when traveling in a car    1. Annual physical exam  Encouraged healthy diet and regular exercise. Due for routine labs. Due for Mammogram. Follow up in 1 year for annual exam - sooner if needed. - Lipid, Fasting; Future  - TSH with Reflex; Future    2. Anxiety  Stable. Continue on current medication regimen. 3. Sciatica of left side  Given exercises on AVS.     4. Class 3 severe obesity due to excess calories without serious comorbidity with body mass index (BMI) of 45.0 to 49.9 in adult [facility])  Encouraged healthy diet and regular exercise. - Lipid, Fasting; Future  - TSH with Reflex; Future    5. Screening for hypothyroidism  - TSH with Reflex; Future    6.  Screening for hyperlipidemia  - Lipid, Fasting; Future     7. Encounter for screening mammogram for malignant neoplasm of breast  - BALDO DIGITAL SCREEN W OR WO CAD BILATERAL; Future    Return in about 1 year (around 1/5/2024) for annual exam, chronic health conditions.     Electronically signed by LUZ MARINA Patel CNP on 1/5/2023 at 10:17 AM

## 2023-05-03 RX ORDER — PROPRANOLOL HYDROCHLORIDE 20 MG/1
TABLET ORAL
Qty: 90 TABLET | Refills: 0 | Status: SHIPPED | OUTPATIENT
Start: 2023-05-03

## 2023-08-02 RX ORDER — PROPRANOLOL HYDROCHLORIDE 20 MG/1
TABLET ORAL
Qty: 90 TABLET | Refills: 0 | Status: SHIPPED | OUTPATIENT
Start: 2023-08-02

## 2023-08-02 RX ORDER — PROPRANOLOL HYDROCHLORIDE 20 MG/1
TABLET ORAL
Qty: 270 TABLET | OUTPATIENT
Start: 2023-08-02

## 2023-08-02 NOTE — TELEPHONE ENCOUNTER
Medication:   Requested Prescriptions     Pending Prescriptions Disp Refills    propranolol (INDERAL) 20 MG tablet [Pharmacy Med Name: PROPRANOLOL 20MG TABLETS] 90 tablet 0     Sig: TAKE 1 TABLET BY MOUTH THREE TIMES DAILY AS NEEDED FOR ANXIETY      Last Filled:      Patient Phone Number: [phone] (home)     Last appt: 1/5/2023   Next appt: 1/5/2024    Last OARRS: No flowsheet data found.   PDMP Monitoring:    Last PDMP Lion Cruz as Reviewed [facility]):  Review User Review Instant Review Result   Blake ARCHER 8/4/2021  1:38 PM Reviewed PDMP [1]     Preferred Pharmacy:       [address] [place], [place] [address]  Phone: [phone] Fax: [phone]

## 2023-08-08 NOTE — CONSULTS
Left message for pt to schedule follow up appointment. Note dictated   Imp  8 mm distal right ureteral stone with hydro and coli  ua with no sign of infection  No fever and normal wbc  Minimal pain this am  Plan  Discussed options   Will discharge home and arrange for ureteroscopy as oupt prob Monday     Discussed with pt who understands and agrees with the plan

## 2023-10-04 RX ORDER — PROPRANOLOL HYDROCHLORIDE 20 MG/1
TABLET ORAL
Qty: 270 TABLET | Refills: 0 | OUTPATIENT
Start: 2023-10-04

## 2023-10-04 RX ORDER — PROPRANOLOL HYDROCHLORIDE 20 MG/1
TABLET ORAL
Qty: 90 TABLET | Refills: 0 | Status: SHIPPED | OUTPATIENT
Start: 2023-10-04

## 2023-10-04 NOTE — TELEPHONE ENCOUNTER
Medication:   Requested Prescriptions     Pending Prescriptions Disp Refills    propranolol (INDERAL) 20 MG tablet [Pharmacy Med Name: PROPRANOLOL 20MG TABLETS] 90 tablet 0     Sig: TAKE 1 TABLET BY MOUTH THREE TIMES DAILY AS NEEDED FOR ANXIETY      Last Filled:      Patient Phone Number: [phone] (home)     Last appt: 1/5/2023   Next appt: 1/5/2024    Last OARRS:        No data to display              PDMP Monitoring:    Last PDMP Robbi Schmitz as Reviewed [facility]):  Review User Review Instant Review Result   Charly Carrillo 8/4/2021  1:38 PM Reviewed PDMP [1]     Preferred Pharmacy:     [facility]ine [facility] [address] [facility], [address] [place] [address],[address] [place] [address] Dada Olson,[facility] [address]  Phone: [phone] Fax: [phone]

## 2023-10-04 NOTE — TELEPHONE ENCOUNTER
Medication:   Requested Prescriptions     Pending Prescriptions Disp Refills    propranolol (INDERAL) 20 MG tablet [Pharmacy Med Name: PROPRANOLOL 20MG TABLETS] 270 tablet      Sig: TAKE 1 TABLET BY MOUTH THREE TIMES DAILY AS NEEDED FOR ANXIETY      Last Filled:      Patient Phone Number: [phone] (home)     Last appt: 1/5/2023   Next appt: 1/5/2024    Last OARRS:        No data to display              PDMP Monitoring:    Last PDMP [facility] as Reviewed [facility]):  Review User Review Instant Review Result   Senait ARCHER 8/4/2021  1:38 PM Reviewed PDMP [1]     Preferred Pharmacy:   [facility] #[id number] [facility], [address] [facility]  [address] [facility]  [address] [address]  Phone: [phone] Fax: [phone]    [facility] [id number] - [address] [place], [place] - [address] [place] [address] [phone] [facility] [phone]  [address] [place] [address]  [address] [address]  Phone: [phone] Fax: [phone] [address] [place] [address] [phone] [address] [facility] [address]  Phone: [phone] Fax: [phone]

## 2023-12-27 RX ORDER — PROPRANOLOL HYDROCHLORIDE 20 MG/1
TABLET ORAL
Qty: 90 TABLET | Refills: 0 | Status: SHIPPED | OUTPATIENT
Start: 2023-12-27 | End: 2023-12-27

## 2023-12-27 RX ORDER — PROPRANOLOL HYDROCHLORIDE 20 MG/1
TABLET ORAL
Qty: 270 TABLET | Refills: 0 | Status: SHIPPED | OUTPATIENT
Start: 2023-12-27

## 2024-08-27 RX ORDER — PROPRANOLOL HCL 20 MG
TABLET ORAL
Qty: 270 TABLET | Refills: 0 | OUTPATIENT
Start: 2024-08-27